# Patient Record
Sex: MALE | URBAN - NONMETROPOLITAN AREA
[De-identification: names, ages, dates, MRNs, and addresses within clinical notes are randomized per-mention and may not be internally consistent; named-entity substitution may affect disease eponyms.]

---
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
## 2021-05-12 ENCOUNTER — DOCTOR'S OFFICE (OUTPATIENT)
Dept: URBAN - NONMETROPOLITAN AREA CLINIC 1 | Facility: CLINIC | Age: 21
Setting detail: OPHTHALMOLOGY
End: 2021-05-12
Payer: COMMERCIAL

## 2021-05-12 VITALS — HEIGHT: 49 IN

## 2021-05-12 DIAGNOSIS — Z01.00: ICD-10-CM

## 2021-05-12 DIAGNOSIS — H52.13: ICD-10-CM

## 2021-05-12 PROCEDURE — 92015 DETERMINE REFRACTIVE STATE: CPT | Performed by: OPTOMETRIST

## 2021-05-12 PROCEDURE — 92014 COMPRE OPH EXAM EST PT 1/>: CPT | Performed by: OPTOMETRIST

## 2021-05-12 PROCEDURE — 92310 CONTACT LENS FITTING OU: CPT | Performed by: OPTOMETRIST

## 2021-05-12 ASSESSMENT — REFRACTION_MANIFEST
OS_CYLINDER: -0.50
OS_SPHERE: -4.50
OD_SPHERE: -4.50
OD_AXIS: 035
OS_VA1: 20/25
OS_AXIS: 125
OD_CYLINDER: -1.00
OS_VA2: 20/25
OD_VA2: 20/25
OD_VA1: 20/25
OU_VA: 20/25

## 2021-05-12 ASSESSMENT — REFRACTION_CURRENTRX
OS_SPHERE: -4.25
OD_CYLINDER: -0.75
OS_OVR_VA: 20/
OS_CYLINDER: -0.25
OD_AXIS: 035
OD_SPHERE: -4.25
OS_VPRISM_DIRECTION: SV
OD_OVR_VA: 20/
OD_VPRISM_DIRECTION: SV
OS_AXIS: 150

## 2021-05-12 ASSESSMENT — VISUAL ACUITY
OS_BCVA: 20/25-2
OD_BCVA: 20/25-1

## 2021-05-12 ASSESSMENT — REFRACTION_AUTOREFRACTION
OD_SPHERE: -4.50
OD_CYLINDER: -1.00
OS_CYLINDER: -0.50
OS_AXIS: 126
OS_SPHERE: -4.50
OD_AXIS: 030

## 2021-05-12 ASSESSMENT — SPHEQUIV_DERIVED
OD_SPHEQUIV: -5
OS_SPHEQUIV: -4.75
OS_SPHEQUIV: -4.75
OD_SPHEQUIV: -5

## 2021-05-12 ASSESSMENT — CONFRONTATIONAL VISUAL FIELD TEST (CVF)
OD_FINDINGS: FULL
OS_FINDINGS: FULL

## 2021-05-12 ASSESSMENT — TONOMETRY
OS_IOP_MMHG: 15
OD_IOP_MMHG: 15

## 2021-08-09 ENCOUNTER — OPTICAL OFFICE (OUTPATIENT)
Dept: URBAN - NONMETROPOLITAN AREA CLINIC 4 | Facility: CLINIC | Age: 21
Setting detail: OPHTHALMOLOGY
End: 2021-08-09

## 2021-08-09 DIAGNOSIS — H52.13: ICD-10-CM

## 2021-08-09 PROCEDURE — S0500 DISPOS CONT LENS: HCPCS | Performed by: OPTOMETRIST

## 2022-02-23 ENCOUNTER — DOCTOR'S OFFICE (OUTPATIENT)
Dept: URBAN - NONMETROPOLITAN AREA CLINIC 1 | Facility: CLINIC | Age: 22
Setting detail: OPHTHALMOLOGY
End: 2022-02-23
Payer: COMMERCIAL

## 2022-02-23 ENCOUNTER — OPTICAL OFFICE (OUTPATIENT)
Dept: URBAN - NONMETROPOLITAN AREA CLINIC 4 | Facility: CLINIC | Age: 22
Setting detail: OPHTHALMOLOGY
End: 2022-02-23
Payer: COMMERCIAL

## 2022-02-23 DIAGNOSIS — H52.13: ICD-10-CM

## 2022-02-23 DIAGNOSIS — Z01.00: ICD-10-CM

## 2022-02-23 PROCEDURE — S0500 DISPOS CONT LENS: HCPCS | Performed by: OPTOMETRIST

## 2022-02-23 PROCEDURE — 92014 COMPRE OPH EXAM EST PT 1/>: CPT | Performed by: OPTOMETRIST

## 2022-02-23 PROCEDURE — 92310 CONTACT LENS FITTING OU: CPT | Performed by: OPTOMETRIST

## 2022-02-23 ASSESSMENT — REFRACTION_AUTOREFRACTION
OD_SPHERE: -4.50
OD_CYLINDER: -1.00
OS_SPHERE: -4.50
OS_AXIS: 126
OS_CYLINDER: -0.50
OD_AXIS: 030

## 2022-02-23 ASSESSMENT — REFRACTION_MANIFEST
OD_AXIS: 035
OS_VA1: 20/25
OS_CYLINDER: -0.50
OD_CYLINDER: -1.00
OS_AXIS: 125
OU_VA: 20/25
OD_VA2: 20/25
OS_SPHERE: -4.50
OD_VA1: 20/25
OD_SPHERE: -4.50
OS_VA2: 20/25

## 2022-02-23 ASSESSMENT — SPHEQUIV_DERIVED
OS_SPHEQUIV: -4.75
OD_SPHEQUIV: -5
OD_SPHEQUIV: -5
OS_SPHEQUIV: -4.75

## 2022-02-23 ASSESSMENT — REFRACTION_CURRENTRX
OD_CYLINDER: -0.75
OS_VPRISM_DIRECTION: SV
OD_VPRISM_DIRECTION: SV
OD_AXIS: 035
OD_SPHERE: -4.25
OS_OVR_VA: 20/
OD_OVR_VA: 20/
OS_CYLINDER: -0.25
OS_AXIS: 150
OS_SPHERE: -4.25

## 2022-02-23 ASSESSMENT — TONOMETRY
OD_IOP_MMHG: 15
OS_IOP_MMHG: 15

## 2022-02-23 ASSESSMENT — VISUAL ACUITY
OD_BCVA: 20/20-2
OS_BCVA: 20/20-2

## 2022-02-23 ASSESSMENT — CONFRONTATIONAL VISUAL FIELD TEST (CVF)
OD_FINDINGS: FULL
OS_FINDINGS: FULL

## 2022-07-15 ENCOUNTER — OPTICAL OFFICE (OUTPATIENT)
Dept: URBAN - NONMETROPOLITAN AREA CLINIC 4 | Facility: CLINIC | Age: 22
Setting detail: OPHTHALMOLOGY
End: 2022-07-15
Payer: COMMERCIAL

## 2022-07-15 DIAGNOSIS — H52.13: ICD-10-CM

## 2022-07-15 PROCEDURE — S0500 DISPOS CONT LENS: HCPCS | Performed by: OPTOMETRIST

## 2023-01-26 ENCOUNTER — OPTICAL OFFICE (OUTPATIENT)
Dept: URBAN - NONMETROPOLITAN AREA CLINIC 4 | Facility: CLINIC | Age: 23
Setting detail: OPHTHALMOLOGY
End: 2023-01-26
Payer: COMMERCIAL

## 2023-01-26 DIAGNOSIS — H52.13: ICD-10-CM

## 2023-01-26 PROCEDURE — S0500 DISPOS CONT LENS: HCPCS | Performed by: OPTOMETRIST

## 2023-02-28 ENCOUNTER — DOCTOR'S OFFICE (OUTPATIENT)
Dept: URBAN - NONMETROPOLITAN AREA CLINIC 1 | Facility: CLINIC | Age: 23
Setting detail: OPHTHALMOLOGY
End: 2023-02-28
Payer: COMMERCIAL

## 2023-02-28 VITALS — HEIGHT: 60 IN

## 2023-02-28 DIAGNOSIS — H52.13: ICD-10-CM

## 2023-02-28 DIAGNOSIS — Z01.00: ICD-10-CM

## 2023-02-28 PROCEDURE — 92014 COMPRE OPH EXAM EST PT 1/>: CPT | Performed by: OPTOMETRIST

## 2023-02-28 PROCEDURE — 92310 CONTACT LENS FITTING OU: CPT | Performed by: OPTOMETRIST

## 2023-02-28 PROCEDURE — 92015 DETERMINE REFRACTIVE STATE: CPT | Performed by: OPTOMETRIST

## 2023-02-28 ASSESSMENT — REFRACTION_AUTOREFRACTION
OD_CYLINDER: -1.75
OS_AXIS: 120
OS_CYLINDER: -0.50
OD_AXIS: 47
OD_SPHERE: -4.75
OS_SPHERE: -4.75

## 2023-02-28 ASSESSMENT — REFRACTION_CURRENTRX
OD_AXIS: 40
OS_CYLINDER: -0.25
OS_SPHERE: -4.25
OD_VPRISM_DIRECTION: SV
OS_OVR_VA: 20/
OS_VPRISM_DIRECTION: SV
OS_AXIS: 149
OD_OVR_VA: 20/
OD_CYLINDER: -0.75
OD_SPHERE: -4.25

## 2023-02-28 ASSESSMENT — CONFRONTATIONAL VISUAL FIELD TEST (CVF)
OS_FINDINGS: FULL
OD_FINDINGS: FULL

## 2023-02-28 ASSESSMENT — TONOMETRY
OS_IOP_MMHG: 14
OD_IOP_MMHG: 14

## 2023-02-28 ASSESSMENT — REFRACTION_MANIFEST
OD_VA2: 20/25
OU_VA: 20/20
OS_VA1: 20/20-2
OS_CYLINDER: -0.50
OS_SPHERE: -4.50
OD_AXIS: 045
OS_AXIS: 125
OS_VA2: 20/20
OD_VA1: 20/25
OD_SPHERE: -4.50
OD_CYLINDER: -1.50

## 2023-02-28 ASSESSMENT — SPHEQUIV_DERIVED
OD_SPHEQUIV: -5.625
OS_SPHEQUIV: -5
OD_SPHEQUIV: -5.25
OS_SPHEQUIV: -4.75

## 2023-02-28 ASSESSMENT — VISUAL ACUITY
OD_BCVA: 20/20
OS_BCVA: 20/25

## 2023-09-29 ENCOUNTER — RX ONLY (RX ONLY)
Age: 23
End: 2023-09-29

## 2023-09-29 ENCOUNTER — DOCTOR'S OFFICE (OUTPATIENT)
Dept: URBAN - NONMETROPOLITAN AREA CLINIC 1 | Facility: CLINIC | Age: 23
Setting detail: OPHTHALMOLOGY
End: 2023-09-29
Payer: COMMERCIAL

## 2023-09-29 DIAGNOSIS — H00.034: ICD-10-CM

## 2023-09-29 PROCEDURE — 67700 BLEPHAROTOMY DRG ABSC EYELID: CPT | Performed by: OPHTHALMOLOGY

## 2023-09-29 ASSESSMENT — REFRACTION_AUTOREFRACTION
OS_SPHERE: -4.75
OD_AXIS: 47
OS_AXIS: 120
OD_CYLINDER: -1.75
OS_CYLINDER: -0.50
OD_SPHERE: -4.75

## 2023-09-29 ASSESSMENT — REFRACTION_MANIFEST
OS_VA1: 20/20-2
OS_VA2: 20/20
OD_CYLINDER: -1.50
OD_SPHERE: -4.50
OS_AXIS: 125
OD_VA2: 20/25
OS_SPHERE: -4.50
OD_AXIS: 045
OU_VA: 20/20
OS_CYLINDER: -0.50
OD_VA1: 20/25

## 2023-09-29 ASSESSMENT — SPHEQUIV_DERIVED
OD_SPHEQUIV: -5.625
OS_SPHEQUIV: -5
OS_SPHEQUIV: -4.75
OD_SPHEQUIV: -5.25

## 2023-09-29 ASSESSMENT — KERATOMETRY
OS_K2POWER_DIOPTERS: 44.25
OD_K1POWER_DIOPTERS: 42.25
OS_K1POWER_DIOPTERS: 43.50
OD_AXISANGLE_DEGREES: 111
OD_K2POWER_DIOPTERS: 44.00
OS_AXISANGLE_DEGREES: 074

## 2023-09-29 ASSESSMENT — REFRACTION_CURRENTRX
OS_CYLINDER: -0.25
OS_AXIS: 149
OD_VPRISM_DIRECTION: SV
OS_OVR_VA: 20/
OS_VPRISM_DIRECTION: SV
OD_CYLINDER: -0.75
OD_SPHERE: -4.25
OD_OVR_VA: 20/
OS_SPHERE: -4.25
OD_AXIS: 40

## 2023-09-29 ASSESSMENT — CONFRONTATIONAL VISUAL FIELD TEST (CVF)
OS_FINDINGS: FULL
OD_FINDINGS: FULL

## 2023-09-29 ASSESSMENT — DRY EYES - PHYSICIAN NOTES
OS_GENERALCOMMENTS: TRACE PEE
OD_GENERALCOMMENTS: TRACE PEE

## 2023-09-29 ASSESSMENT — AXIALLENGTH_DERIVED
OS_AL: 25.4389
OD_AL: 26.1763
OS_AL: 25.5526
OD_AL: 25.9977

## 2023-09-29 ASSESSMENT — VISUAL ACUITY
OS_BCVA: 20/20-1
OD_BCVA: 20/20-1

## 2023-10-11 ENCOUNTER — DOCTOR'S OFFICE (OUTPATIENT)
Dept: URBAN - NONMETROPOLITAN AREA CLINIC 1 | Facility: CLINIC | Age: 23
Setting detail: OPHTHALMOLOGY
End: 2023-10-11
Payer: COMMERCIAL

## 2023-10-11 DIAGNOSIS — H00.034: ICD-10-CM

## 2023-10-11 PROCEDURE — NO CHARGE N/C PROFESSIONAL COURTESY: Performed by: OPHTHALMOLOGY

## 2023-10-12 ASSESSMENT — REFRACTION_MANIFEST
OU_VA: 20/20
OS_SPHERE: -4.50
OS_VA2: 20/20
OD_AXIS: 045
OS_AXIS: 125
OD_VA1: 20/25
OS_VA1: 20/20-2
OD_VA2: 20/25
OS_CYLINDER: -0.50
OD_SPHERE: -4.50
OD_CYLINDER: -1.50

## 2023-10-12 ASSESSMENT — REFRACTION_AUTOREFRACTION
OS_AXIS: 120
OD_CYLINDER: -1.75
OS_CYLINDER: -0.50
OD_SPHERE: -4.75
OS_SPHERE: -4.75
OD_AXIS: 47

## 2023-10-12 ASSESSMENT — KERATOMETRY
OD_AXISANGLE_DEGREES: 111
OD_K1POWER_DIOPTERS: 42.25
OS_K1POWER_DIOPTERS: 43.50
OS_AXISANGLE_DEGREES: 074
OS_K2POWER_DIOPTERS: 44.25
OD_K2POWER_DIOPTERS: 44.00

## 2023-10-12 ASSESSMENT — SPHEQUIV_DERIVED
OD_SPHEQUIV: -5.625
OS_SPHEQUIV: -5
OS_SPHEQUIV: -4.75
OD_SPHEQUIV: -5.25

## 2023-10-12 ASSESSMENT — VISUAL ACUITY
OD_BCVA: 20/20-1
OS_BCVA: 20/20-1

## 2023-10-12 ASSESSMENT — AXIALLENGTH_DERIVED
OD_AL: 25.9977
OS_AL: 25.5526
OD_AL: 26.1763
OS_AL: 25.4389

## 2023-10-12 ASSESSMENT — REFRACTION_CURRENTRX
OS_AXIS: 149
OD_CYLINDER: -0.75
OS_OVR_VA: 20/
OS_VPRISM_DIRECTION: SV
OD_VPRISM_DIRECTION: SV
OD_AXIS: 40
OD_OVR_VA: 20/
OS_SPHERE: -4.25
OS_CYLINDER: -0.25
OD_SPHERE: -4.25

## 2024-06-23 ENCOUNTER — HOSPITAL ENCOUNTER (OUTPATIENT)
Facility: HOSPITAL | Age: 24
Setting detail: OBSERVATION
Discharge: HOME | End: 2024-06-25
Attending: EMERGENCY MEDICINE | Admitting: STUDENT IN AN ORGANIZED HEALTH CARE EDUCATION/TRAINING PROGRAM
Payer: COMMERCIAL

## 2024-06-23 ENCOUNTER — APPOINTMENT (EMERGENCY)
Dept: RADIOLOGY | Facility: HOSPITAL | Age: 24
End: 2024-06-23
Payer: COMMERCIAL

## 2024-06-23 DIAGNOSIS — R00.2 PALPITATION: Primary | ICD-10-CM

## 2024-06-23 DIAGNOSIS — R07.9 CHEST PAIN: ICD-10-CM

## 2024-06-23 DIAGNOSIS — R79.89 ELEVATED TROPONIN LEVEL: ICD-10-CM

## 2024-06-23 DIAGNOSIS — E78.2 MIXED HYPERLIPIDEMIA: ICD-10-CM

## 2024-06-23 DIAGNOSIS — I51.4 MYOCARDITIS (HCC): ICD-10-CM

## 2024-06-23 DIAGNOSIS — R79.89 ELEVATED TROPONIN I LEVEL: ICD-10-CM

## 2024-06-23 DIAGNOSIS — L24.81 IRRITANT CONTACT DERMATITIS DUE TO METALS: ICD-10-CM

## 2024-06-23 DIAGNOSIS — R00.0 TACHYCARDIA: ICD-10-CM

## 2024-06-23 PROBLEM — R07.89 CHEST DISCOMFORT: Status: ACTIVE | Noted: 2024-06-23

## 2024-06-23 PROBLEM — E83.42 HYPOMAGNESEMIA: Status: ACTIVE | Noted: 2024-06-23

## 2024-06-23 PROBLEM — F41.1 GENERALIZED ANXIETY DISORDER WITH PANIC ATTACKS: Status: ACTIVE | Noted: 2024-06-23

## 2024-06-23 PROBLEM — F41.0 ANXIETY DISORDER DUE TO GENERAL MEDICAL CONDITION WITH PANIC ATTACK: Status: ACTIVE | Noted: 2024-06-23

## 2024-06-23 PROBLEM — F06.4 ANXIETY DISORDER DUE TO GENERAL MEDICAL CONDITION WITH PANIC ATTACK: Status: ACTIVE | Noted: 2024-06-23

## 2024-06-23 LAB
2HR DELTA HS TROPONIN: 220 NG/L
4HR DELTA HS TROPONIN: 1269 NG/L
ALBUMIN SERPL BCG-MCNC: 4.4 G/DL (ref 3.5–5)
ALP SERPL-CCNC: 76 U/L (ref 34–104)
ALT SERPL W P-5'-P-CCNC: 17 U/L (ref 7–52)
ANION GAP SERPL CALCULATED.3IONS-SCNC: 8 MMOL/L (ref 4–13)
APTT PPP: 23 SECONDS (ref 23–37)
AST SERPL W P-5'-P-CCNC: 22 U/L (ref 13–39)
BASOPHILS # BLD AUTO: 0.03 THOUSANDS/ÂΜL (ref 0–0.1)
BASOPHILS NFR BLD AUTO: 0 % (ref 0–1)
BILIRUB SERPL-MCNC: 0.21 MG/DL (ref 0.2–1)
BNP SERPL-MCNC: 6 PG/ML (ref 0–100)
BUN SERPL-MCNC: 16 MG/DL (ref 5–25)
CALCIUM SERPL-MCNC: 9.4 MG/DL (ref 8.4–10.2)
CARDIAC TROPONIN I PNL SERPL HS: 1661 NG/L
CARDIAC TROPONIN I PNL SERPL HS: 392 NG/L
CARDIAC TROPONIN I PNL SERPL HS: 612 NG/L
CHLORIDE SERPL-SCNC: 106 MMOL/L (ref 96–108)
CK SERPL-CCNC: 210 U/L (ref 39–308)
CO2 SERPL-SCNC: 25 MMOL/L (ref 21–32)
CREAT SERPL-MCNC: 1.18 MG/DL (ref 0.6–1.3)
D DIMER PPP FEU-MCNC: <0.27 UG/ML FEU
EOSINOPHIL # BLD AUTO: 0.28 THOUSAND/ÂΜL (ref 0–0.61)
EOSINOPHIL NFR BLD AUTO: 3 % (ref 0–6)
ERYTHROCYTE [DISTWIDTH] IN BLOOD BY AUTOMATED COUNT: 12.8 % (ref 11.6–15.1)
EST. AVERAGE GLUCOSE BLD GHB EST-MCNC: 108 MG/DL
GFR SERPL CREATININE-BSD FRML MDRD: 86 ML/MIN/1.73SQ M
GLUCOSE SERPL-MCNC: 96 MG/DL (ref 65–140)
HBA1C MFR BLD: 5.4 %
HCT VFR BLD AUTO: 43.1 % (ref 36.5–49.3)
HGB BLD-MCNC: 14 G/DL (ref 12–17)
IMM GRANULOCYTES # BLD AUTO: 0.02 THOUSAND/UL (ref 0–0.2)
IMM GRANULOCYTES NFR BLD AUTO: 0 % (ref 0–2)
INR PPP: 0.92 (ref 0.84–1.19)
LACTATE SERPL-SCNC: 1.1 MMOL/L (ref 0.5–2)
LIPASE SERPL-CCNC: 20 U/L (ref 11–82)
LITHIUM SERPL-SCNC: <0.1 MMOL/L (ref 0.6–1.2)
LYMPHOCYTES # BLD AUTO: 2.57 THOUSANDS/ÂΜL (ref 0.6–4.47)
LYMPHOCYTES NFR BLD AUTO: 26 % (ref 14–44)
MAGNESIUM SERPL-MCNC: 1.8 MG/DL (ref 1.9–2.7)
MCH RBC QN AUTO: 28.6 PG (ref 26.8–34.3)
MCHC RBC AUTO-ENTMCNC: 32.5 G/DL (ref 31.4–37.4)
MCV RBC AUTO: 88 FL (ref 82–98)
MONOCYTES # BLD AUTO: 0.7 THOUSAND/ÂΜL (ref 0.17–1.22)
MONOCYTES NFR BLD AUTO: 7 % (ref 4–12)
NEUTROPHILS # BLD AUTO: 6.28 THOUSANDS/ÂΜL (ref 1.85–7.62)
NEUTS SEG NFR BLD AUTO: 64 % (ref 43–75)
NRBC BLD AUTO-RTO: 0 /100 WBCS
PLATELET # BLD AUTO: 266 THOUSANDS/UL (ref 149–390)
PMV BLD AUTO: 10.6 FL (ref 8.9–12.7)
POTASSIUM SERPL-SCNC: 4.5 MMOL/L (ref 3.5–5.3)
PROT SERPL-MCNC: 7.3 G/DL (ref 6.4–8.4)
PROTHROMBIN TIME: 12.7 SECONDS (ref 11.6–14.5)
RBC # BLD AUTO: 4.89 MILLION/UL (ref 3.88–5.62)
SODIUM SERPL-SCNC: 139 MMOL/L (ref 135–147)
TSH SERPL DL<=0.05 MIU/L-ACNC: 4.43 UIU/ML (ref 0.45–4.5)
WBC # BLD AUTO: 9.88 THOUSAND/UL (ref 4.31–10.16)

## 2024-06-23 PROCEDURE — 99285 EMERGENCY DEPT VISIT HI MDM: CPT

## 2024-06-23 PROCEDURE — 84443 ASSAY THYROID STIM HORMONE: CPT | Performed by: EMERGENCY MEDICINE

## 2024-06-23 PROCEDURE — 80053 COMPREHEN METABOLIC PANEL: CPT | Performed by: EMERGENCY MEDICINE

## 2024-06-23 PROCEDURE — 93005 ELECTROCARDIOGRAM TRACING: CPT

## 2024-06-23 PROCEDURE — 99285 EMERGENCY DEPT VISIT HI MDM: CPT | Performed by: EMERGENCY MEDICINE

## 2024-06-23 PROCEDURE — 83735 ASSAY OF MAGNESIUM: CPT | Performed by: EMERGENCY MEDICINE

## 2024-06-23 PROCEDURE — 82550 ASSAY OF CK (CPK): CPT | Performed by: EMERGENCY MEDICINE

## 2024-06-23 PROCEDURE — 80178 ASSAY OF LITHIUM: CPT | Performed by: STUDENT IN AN ORGANIZED HEALTH CARE EDUCATION/TRAINING PROGRAM

## 2024-06-23 PROCEDURE — 85025 COMPLETE CBC W/AUTO DIFF WBC: CPT | Performed by: EMERGENCY MEDICINE

## 2024-06-23 PROCEDURE — 83880 ASSAY OF NATRIURETIC PEPTIDE: CPT | Performed by: EMERGENCY MEDICINE

## 2024-06-23 PROCEDURE — 85379 FIBRIN DEGRADATION QUANT: CPT | Performed by: EMERGENCY MEDICINE

## 2024-06-23 PROCEDURE — 84484 ASSAY OF TROPONIN QUANT: CPT | Performed by: EMERGENCY MEDICINE

## 2024-06-23 PROCEDURE — 85730 THROMBOPLASTIN TIME PARTIAL: CPT | Performed by: EMERGENCY MEDICINE

## 2024-06-23 PROCEDURE — 83605 ASSAY OF LACTIC ACID: CPT | Performed by: EMERGENCY MEDICINE

## 2024-06-23 PROCEDURE — 83036 HEMOGLOBIN GLYCOSYLATED A1C: CPT | Performed by: STUDENT IN AN ORGANIZED HEALTH CARE EDUCATION/TRAINING PROGRAM

## 2024-06-23 PROCEDURE — 71045 X-RAY EXAM CHEST 1 VIEW: CPT

## 2024-06-23 PROCEDURE — 83690 ASSAY OF LIPASE: CPT | Performed by: EMERGENCY MEDICINE

## 2024-06-23 PROCEDURE — 85610 PROTHROMBIN TIME: CPT | Performed by: EMERGENCY MEDICINE

## 2024-06-23 PROCEDURE — 36415 COLL VENOUS BLD VENIPUNCTURE: CPT | Performed by: EMERGENCY MEDICINE

## 2024-06-23 PROCEDURE — 99223 1ST HOSP IP/OBS HIGH 75: CPT | Performed by: STUDENT IN AN ORGANIZED HEALTH CARE EDUCATION/TRAINING PROGRAM

## 2024-06-23 RX ORDER — VENLAFAXINE HYDROCHLORIDE 150 MG/1
150 CAPSULE, EXTENDED RELEASE ORAL 2 TIMES DAILY
COMMUNITY

## 2024-06-23 RX ORDER — CHLORPROMAZINE HYDROCHLORIDE 25 MG/1
100 TABLET, FILM COATED ORAL
Status: DISCONTINUED | OUTPATIENT
Start: 2024-06-23 | End: 2024-06-25 | Stop reason: HOSPADM

## 2024-06-23 RX ORDER — LITHIUM CARBONATE 450 MG
900 TABLET, EXTENDED RELEASE ORAL
Status: DISCONTINUED | OUTPATIENT
Start: 2024-06-23 | End: 2024-06-25 | Stop reason: HOSPADM

## 2024-06-23 RX ORDER — PROPRANOLOL HCL 60 MG
60 CAPSULE, EXTENDED RELEASE 24HR ORAL DAILY
Status: DISCONTINUED | OUTPATIENT
Start: 2024-06-23 | End: 2024-06-24

## 2024-06-23 RX ORDER — LITHIUM CARBONATE 450 MG
900 TABLET, EXTENDED RELEASE ORAL
COMMUNITY

## 2024-06-23 RX ORDER — CLONAZEPAM 0.5 MG/1
0.5 TABLET ORAL 2 TIMES DAILY PRN
Status: DISCONTINUED | OUTPATIENT
Start: 2024-06-23 | End: 2024-06-25 | Stop reason: HOSPADM

## 2024-06-23 RX ORDER — METOPROLOL TARTRATE 1 MG/ML
5 INJECTION, SOLUTION INTRAVENOUS EVERY 8 HOURS PRN
Status: DISCONTINUED | OUTPATIENT
Start: 2024-06-23 | End: 2024-06-25 | Stop reason: HOSPADM

## 2024-06-23 RX ORDER — ENOXAPARIN SODIUM 100 MG/ML
40 INJECTION SUBCUTANEOUS DAILY
Status: DISCONTINUED | OUTPATIENT
Start: 2024-06-23 | End: 2024-06-25 | Stop reason: HOSPADM

## 2024-06-23 RX ORDER — CLONAZEPAM 0.5 MG/1
0.5 TABLET ORAL 2 TIMES DAILY PRN
COMMUNITY

## 2024-06-23 RX ORDER — MAGNESIUM SULFATE 1 G/100ML
1 INJECTION INTRAVENOUS ONCE
Status: COMPLETED | OUTPATIENT
Start: 2024-06-23 | End: 2024-06-23

## 2024-06-23 RX ORDER — CHLORPROMAZINE HYDROCHLORIDE 100 MG/1
100 TABLET, FILM COATED ORAL
COMMUNITY
Start: 2024-06-05

## 2024-06-23 RX ORDER — ATORVASTATIN CALCIUM 20 MG/1
20 TABLET, FILM COATED ORAL
Status: DISCONTINUED | OUTPATIENT
Start: 2024-06-23 | End: 2024-06-25 | Stop reason: HOSPADM

## 2024-06-23 RX ORDER — CARIPRAZINE 3 MG/1
3 CAPSULE, GELATIN COATED ORAL DAILY
COMMUNITY

## 2024-06-23 RX ORDER — VENLAFAXINE HYDROCHLORIDE 150 MG/1
150 CAPSULE, EXTENDED RELEASE ORAL 2 TIMES DAILY
Status: DISCONTINUED | OUTPATIENT
Start: 2024-06-23 | End: 2024-06-25 | Stop reason: HOSPADM

## 2024-06-23 RX ADMIN — ASPIRIN 324 MG: 81 TABLET, COATED ORAL at 13:14

## 2024-06-23 RX ADMIN — ENOXAPARIN SODIUM 40 MG: 40 INJECTION SUBCUTANEOUS at 14:31

## 2024-06-23 RX ADMIN — MAGNESIUM SULFATE HEPTAHYDRATE 1 G: 1 INJECTION, SOLUTION INTRAVENOUS at 13:14

## 2024-06-23 RX ADMIN — HYDROCORTISONE: 25 CREAM TOPICAL at 21:31

## 2024-06-23 RX ADMIN — VENLAFAXINE HYDROCHLORIDE 150 MG: 150 CAPSULE, EXTENDED RELEASE ORAL at 21:30

## 2024-06-23 RX ADMIN — CHLORPROMAZINE HYDROCHLORIDE 100 MG: 25 TABLET, FILM COATED ORAL at 21:30

## 2024-06-23 RX ADMIN — PROPRANOLOL HYDROCHLORIDE 60 MG: 60 CAPSULE, EXTENDED RELEASE ORAL at 13:14

## 2024-06-23 RX ADMIN — LITHIUM CARBONATE 900 MG: 450 TABLET ORAL at 21:30

## 2024-06-23 RX ADMIN — VENLAFAXINE HYDROCHLORIDE 150 MG: 150 CAPSULE, EXTENDED RELEASE ORAL at 14:31

## 2024-06-23 RX ADMIN — ATORVASTATIN CALCIUM 20 MG: 20 TABLET, FILM COATED ORAL at 16:17

## 2024-06-23 NOTE — PLAN OF CARE
Problem: PAIN - ADULT  Goal: Verbalizes/displays adequate comfort level or baseline comfort level  Description: Interventions:  - Encourage patient to monitor pain and request assistance  - Assess pain using appropriate pain scale  - Administer analgesics based on type and severity of pain and evaluate response  - Implement non-pharmacological measures as appropriate and evaluate response  - Consider cultural and social influences on pain and pain management  - Notify physician/advanced practitioner if interventions unsuccessful or patient reports new pain  Outcome: Progressing     Problem: INFECTION - ADULT  Goal: Absence or prevention of progression during hospitalization  Description: INTERVENTIONS:  - Assess and monitor for signs and symptoms of infection  - Monitor lab/diagnostic results  - Monitor all insertion sites, i.e. indwelling lines, tubes, and drains  - Monitor endotracheal if appropriate and nasal secretions for changes in amount and color  - Coulter appropriate cooling/warming therapies per order  - Administer medications as ordered  - Instruct and encourage patient and family to use good hand hygiene technique  - Identify and instruct in appropriate isolation precautions for identified infection/condition  Outcome: Progressing     Problem: SAFETY ADULT  Goal: Patient will remain free of falls  Description: INTERVENTIONS:  - Educate patient/family on patient safety including physical limitations  - Instruct patient to call for assistance with activity   - Consult OT/PT to assist with strengthening/mobility   - Keep Call bell within reach  - Keep bed low and locked with side rails adjusted as appropriate  - Keep care items and personal belongings within reach  - Initiate and maintain comfort rounds  - Make Fall Risk Sign visible to staff    - Apply yellow socks and bracelet for high fall risk patients  - Consider moving patient to room near nurses station  Outcome: Progressing  Goal: Maintain or  return to baseline ADL function  Description: INTERVENTIONS:  -  Assess patient's ability to carry out ADLs; assess patient's baseline for ADL function and identify physical deficits which impact ability to perform ADLs (bathing, care of mouth/teeth, toileting, grooming, dressing, etc.)  - Assess/evaluate cause of self-care deficits   - Assess range of motion  - Assess patient's mobility; develop plan if impaired  - Assess patient's need for assistive devices and provide as appropriate  - Encourage maximum independence but intervene and supervise when necessary  - Involve family in performance of ADLs  - Assess for home care needs following discharge   - Consider OT consult to assist with ADL evaluation and planning for discharge  - Provide patient education as appropriate  Outcome: Progressing  Goal: Maintains/Returns to pre admission functional level  Description: INTERVENTIONS:  - Perform AM-PAC 6 Click Basic Mobility/ Daily Activity assessment daily.  - Set and communicate daily mobility goal to care team and patient/family/caregiver.   - Collaborate with rehabilitation services on mobility goals if consulted    - Out of bed for toileting  - Record patient progress and toleration of activity level   Outcome: Progressing     Problem: DISCHARGE PLANNING  Goal: Discharge to home or other facility with appropriate resources  Description: INTERVENTIONS:  - Identify barriers to discharge w/patient and caregiver  - Arrange for needed discharge resources and transportation as appropriate  - Identify discharge learning needs (meds, wound care, etc.)  - Arrange for interpretive services to assist at discharge as needed  - Refer to Case Management Department for coordinating discharge planning if the patient needs post-hospital services based on physician/advanced practitioner order or complex needs related to functional status, cognitive ability, or social support system  Outcome: Progressing     Problem: Knowledge  Deficit  Goal: Patient/family/caregiver demonstrates understanding of disease process, treatment plan, medications, and discharge instructions  Description: Complete learning assessment and assess knowledge base.  Interventions:  - Provide teaching at level of understanding  - Provide teaching via preferred learning methods  Outcome: Progressing     Problem: CARDIOVASCULAR - ADULT  Goal: Maintains optimal cardiac output and hemodynamic stability  Description: INTERVENTIONS:  - Monitor I/O, vital signs and rhythm  - Monitor for S/S and trends of decreased cardiac output  - Administer and titrate ordered vasoactive medications to optimize hemodynamic stability  - Assess quality of pulses, skin color and temperature  - Assess for signs of decreased coronary artery perfusion  - Instruct patient to report change in severity of symptoms  Outcome: Progressing  Goal: Absence of cardiac dysrhythmias or at baseline rhythm  Description: INTERVENTIONS:  - Continuous cardiac monitoring, vital signs, obtain 12 lead EKG if ordered  - Administer antiarrhythmic and heart rate control medications as ordered  - Monitor electrolytes and administer replacement therapy as ordered  Outcome: Progressing     Problem: MOBILITY - ADULT  Goal: Maintain or return to baseline ADL function  Description: INTERVENTIONS:  -  Assess patient's ability to carry out ADLs; assess patient's baseline for ADL function and identify physical deficits which impact ability to perform ADLs (bathing, care of mouth/teeth, toileting, grooming, dressing, etc.)  - Assess/evaluate cause of self-care deficits   - Assess range of motion  - Assess patient's mobility; develop plan if impaired  - Assess patient's need for assistive devices and provide as appropriate  - Encourage maximum independence but intervene and supervise when necessary  - Involve family in performance of ADLs  - Assess for home care needs following discharge   - Consider OT consult to assist with ADL  evaluation and planning for discharge  - Provide patient education as appropriate  Outcome: Progressing  Goal: Maintains/Returns to pre admission functional level  Description: INTERVENTIONS:  - Perform AM-PAC 6 Click Basic Mobility/ Daily Activity assessment daily.  - Set and communicate daily mobility goal to care team and patient/family/caregiver.   - Collaborate with rehabilitation services on mobility goals if consulted    - Out of bed for toileting  - Record patient progress and toleration of activity level   Outcome: Progressing

## 2024-06-23 NOTE — ASSESSMENT & PLAN NOTE
"Has hx of HARMONY with panic attacks \"once every month or so\"   Used to be on Propranolol about 3 years ago and uses PRN Klonopin, restarting /continuing both especially now with the reported tachycardia event on 6/23   To continue follow up with PCP and discuss referral to Behavioral Health / Psych Therapy as indicated  Mood is very stable and in no distress during admission   continue POA Lithium  Lithium level <0.10  continue Effexor   hold off Cariprazine for now given suspected side effect/arrhythmia - will discuss with cardiology regarding resuming med  continue POA Chlorpromazine   Started on propranolol, but d/c and switched to toprol XL  PRN Klonopin   "

## 2024-06-23 NOTE — ASSESSMENT & PLAN NOTE
"Lower abdominal wall with rash x 2 weeks from new belt as per patient  Has been using OTC Hydrocortisone which \"was helping\" will continue while inpatient  "

## 2024-06-23 NOTE — ASSESSMENT & PLAN NOTE
" Latest Reference Range & Units 06/23/24 07:44 06/23/24 09:13 06/23/24 11:48   Total CK 39 - 308 U/L 210     hs TnI 0hr \"Refer to ACS Flowchart\"- see link ng/L 392 (H)     hs TnI 2hr \"Refer to ACS Flowchart\"- see link ng/L  612 (H)    Delta 2hr hsTnI <20 ng/L  220 (H)    hs TnI 4hr \"Refer to ACS Flowchart\"- see link ng/L   1,661 (H)   Delta 4hr hsTnI <20 ng/L   1,269 (H)   BNP 0 - 100 pg/mL 6         Most likely type II , non ischemic due to the palpitation / tachycardia with HR reportedly > 200 for ~ 2 hours prior to admission from 5:30 AM to about 7:30 AM per patient   Repeate EKG ~ 1 PM , NSR , no ST changes.   Explained while less likely but for completeness and given no hx of bleeding issue, will give  mg x 1 dose for now till further Cardiology eval   Will continue to monitor on Tele       "

## 2024-06-23 NOTE — ASSESSMENT & PLAN NOTE
09/08/2023 06/15/2023         Cholesterol 209 High     214 High       Triglyceride 162 High     141   Cholesterol, HDL, Direct 42 52   Cholesterol, Non- High     162 High       Cholesterol, LDL, Calculated 135 High      134 High        CHOL/HDL Ratio 5.0 4     HLD, BMI 34.9, hx of tachycardia in setting of anxiety and panic attacks, and patient is concerned about family hx of cardiac disease   A1c 5.4  discussed given age life style modification, diet and exercise is first line recommendations   the option of low-medium intensity statin is explained and patient prefers to start , ordered Lipitor 20 mg QD , possible side effects explained including myopathy and LFTs elevation , will check CMP in 1-2 weeks post discharge

## 2024-06-23 NOTE — H&P
"[facility]  H&P  Name: Sukih Blanca 24 y.o. male I MRN: [id number]  Unit/Bed#: -[facility] I Date of Admission: 6/23/2024   Date of Service: 6/23/2024 I Hospital Day: 0      Assessment & Plan   Tachycardia  Assessment & Plan  Hx of anxiety disorder with panic attacks ~ once every month , per patient  He used to be on Propranolol in the past which used to help with his HR and Anxiety symptoms , but about 2-3 years ago when he switched provider - as per patient - the med was stopped \"thought to be not helping much with the anxiety\"   Denies excessive coffee intake, \"only 1-2 Brisk iced tea a day\" , denies coffee or energy drinks use, denies drug abuse, non smoker.   TSH wnl     6/23 from ~ 5:30 am - ~ 7:30 am while was at his night shift at [employer] felt palpitation (lasted ~ 2 hours) with mild diaphoresis and transient chest discomfort described as \"somewhat sharp went toward jaw and last ~ 7 minutes then resolved but palpitation remained for two hours\"     In the ED and during admission now NSR 70-80's with BP mildly elevated ~130's low 140's ; Heart sounds normal , no murmur, no JVD, euvolemic on exam , CTAB     Patient is on multiple psych meds which is known to possibly cause arrhythmia and also has hx of panic attacks.     PLAN  - Given the reported tachycardia and panic attacks events will restart propranolol at low dose for now to help with both   - continue to monitor on tele and potentially will need extended monitor , consult cardiology , input appreciated   - TSH wnl   - will obtain ECHO to evaluate and rule out anatomical /valvular dysfunction, but less likely    - given on Lithium which may cause Qtc prolongation, will check the Lithium level. Also on Chloropromazine which can cause QT prolongation but of note Qtc 6/23 on repeate EKG is WNL   - noted patient on Effexor 150 mg BID (exceeding max recommended dose) and given tachycardia is possible side effect , will decrease " "to 150 mg QD   - holding off Cariprazine for now due to the known cardiotoxicity/arrhythmia side effects , till further eval       * Elevated troponin level  Assessment & Plan   Latest Reference Range & Units 06/23/24 07:44 06/23/24 09:13 06/23/24 11:48   Total CK 39 - 308 U/L 210     hs TnI 0hr \"Refer to ACS Flowchart\"- see link ng/L 392 (H)     hs TnI 2hr \"Refer to ACS Flowchart\"- see link ng/L  612 (H)    Delta 2hr hsTnI <20 ng/L  220 (H)    hs TnI 4hr \"Refer to ACS Flowchart\"- see link ng/L   1,661 (H)   Delta 4hr hsTnI <20 ng/L   1,269 (H)   BNP 0 - 100 pg/mL 6         Most likely type II , non ischemic due to the palpitation / tachycardia with HR reportedly > 200 for ~ 2 hours prior to admission from 5:30 AM to about 7:30 AM per patient   Repeate EKG ~ 1 PM , NSR , no ST changes.   Explained while less likely but for completeness and given no hx of bleeding issue, will give  mg x 1 dose for now till further Cardiology eval   Will continue to monitor on Tele         Irritant contact dermatitis due to metals  Assessment & Plan  Lower abdominal wall with rash x 2 weeks from new belt as per patient  Has been using OTC Hydrocortisone which \"was helping\" will continue while inpatient      Hypomagnesemia  Assessment & Plan  Mg 1.8 , repletion ordered 1 g     Chest discomfort  Assessment & Plan  Transient chest discomfort \"somewhat sharp\" radiated to jaw , for about 7 minutes in setting of tachycardia/palpitation with HR measure > 200 BPM for about two hours   Most likely chest discomfort due to the tachycardia , less likely ischemic etiology   Cardiology consulted , input appreciated   See elevated troponin A&P     Mixed hyperlipidemia  Assessment & Plan    09/08/2023 06/15/2023         Cholesterol 209 High     214 High       Triglyceride 162 High     141   Cholesterol, HDL, Direct 42 52   Cholesterol, Non- High     162 High       Cholesterol, LDL, Calculated 135 High      134 High        CHOL/HDL Ratio " "5.0 4     HLD, BMI 34.9, hx of tachycardia in setting of anxiety and panic attacks, and patient is concerned about family hx of cardiac disease   - will check A1c   - discussed given age life style modification, diet and exercise is first line recommendations   - the option of low-medium intensity statin is explained and patient prefers to start , ordered Lipitor 20 mg QD , possible side effects explained including myopathy and LFTs elevation , will check CMP in 1-2 weeks post discharge    Generalized anxiety disorder with panic attacks  Assessment & Plan  Has hx of HARMONY with panic attacks \"once every month or so\"   Used to be on Propranolol about 3 years ago and uses PRN Klonopin, restarting /continuing both especially now with the reported tachycardia event on 6/23   To continue follow up with PCP and discuss referral to Behavioral Health / Psych Therapy as indicated  Mood is very stable and in no distress during admission     - continue POA Lithium but check the level  - continue Effexor but decreased to 150 mg QD not BID   - hold off Cariprazine for now given suspected side effect/arrhythmia   - continue POA Chlorpromazine   - starting Propranolol 60 mg QD for anxiety and tachycardia   - PRN Klonopin              VTE Pharmacologic Prophylaxis:   Moderate Risk (Score 3-4) - Pharmacological DVT Prophylaxis Ordered: enoxaparin (Lovenox).  Code Status: Level 1 - Full Code   Discussion with family: Updated  (mother) at bedside.    Anticipated Length of Stay: Patient will be admitted on an inpatient basis with an anticipated length of stay of greater than 2 midnights secondary to Arrhythmia / elevated troponin , cardiology evaluation .    Total Time Spent on Date of Encounter in care of patient: 50 mins. This time was spent on one or more of the following: performing physical exam; counseling and coordination of care; obtaining or reviewing history; documenting in the medical record; reviewing/ordering " "tests, medications or procedures; communicating with other healthcare professionals and discussing with patient's family/caregivers.    Chief Complaint: tachycardia ~ about 2 hours     History of Present Illness:  Sukhi Blanca is a 24 y.o. male with a PMH of general anxiety disorder, depression, bipolar, on psych Meds as listed above, also has hx of panic attacks ~ once a month and was on Proprnolol in the past but was switched off it to current psych medications about 2-3 years ago , never smoker, hx of HLD and reports family hx of cardiac disease. 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 6/23 ~ 5:30 AM had palpitation ~ 2 hours with HR measured > 200 , during which had transient chest discomfort described as sharp and somewhat radiated to jaw but lasted only for 7 minutes, then resolved but palpitation continued till ~ 7:30     During admission, NSR, normal EKG, all symptoms resolved, mother at bedside. Above A&P reviewed and explained in details. All questions answered and they agree to above.     Review of Systems:  Review of Systems  - GENERAL: Negative for any nausea, vomiting, fevers, chills, or weight loss.  - HEENT: Negative for any head/Neck trauma, pain, double/blurry vision, sinusitis, rhinitis, nose bleeding.  - CARDIAC: Positive for palpitation and chest discomfort, as described above prior to admission, resolved on arrival to the hospital, currently negative for any chest pain, palpitation, Dyspnea on exertion, peripheral edema.  - PULMONARY: Negative for any SOB, cough, wheezing.   - GASTROINTESTINAL: Negative for any abdominal pain, N/V/D/C, blood in stool.   - GENITOURINARY: Negative for any dysuria, hematuria, incontinence.  - NEUROLOGIC: Negative for any muscle weakness, numbness/tingling, memory changes.    - MUSCULOSKELETAL: Negative for any joint pains/swelling, limited ROM.   - INTEGUMENTARY: Negative for any rashes, cuts/ lesions.  - HEMATOLOGIC: " Negative for any abnormal bruising, frequent infections or bleeding.  -Psych: History of depression, anxiety and panic attacks with symptoms including palpitation and diaphoresis about once in a month      Past Medical and Surgical History:   Past Medical History:   Diagnosis Date    Bipolar 1 disorder, mixed (HCC)        Past Surgical History:   Procedure Laterality Date    TYMPANOSTOMY TUBE PLACEMENT         Meds/Allergies:  Prior to Admission medications    Medication Sig Start Date End Date Taking? Authorizing Provider   chlorproMAZINE (THORAZINE) 100 mg tablet Take 100 mg by mouth daily at bedtime 6/5/24  Yes Historical Provider, MD   clonazePAM (KlonoPIN) 0.5 mg tablet Take 0.5 mg by mouth 2 (two) times a day as needed   Yes Historical Provider, MD   lithium carbonate (LITHOBID) 450 mg CR tablet Take 900 mg by mouth daily at bedtime   Yes Historical Provider, MD   venlafaxine (EFFEXOR-XR) 150 mg 24 hr capsule Take 150 mg by mouth 2 (two) times a day   Yes Historical Provider, MD   Vraylar 3 MG capsule Take 3 mg by mouth daily   Yes Historical Provider, MD ELLIOTT have reviewed home medications with patient personally.    Allergies: No Known Allergies    Social History:  Marital Status: Single   Occupation: [employer], stocking shelves night shift  Patient Pre-hospital Living Situation: Home  Patient Pre-hospital Level of Mobility: walks  Patient Pre-hospital Diet Restrictions: None  Substance Use History:   Social History     Substance and Sexual Activity   Alcohol Use Not Currently    Comment: socially     Social History     Tobacco Use   Smoking Status Never   Smokeless Tobacco Never     Social History     Substance and Sexual Activity   Drug Use Never       Family History:  History reviewed. No pertinent family history.    Physical Exam:     Vitals:   Blood Pressure: 133/74 (06/23/24 1508)  Pulse: 80 (06/23/24 1508)  Temperature: 97.7 °F (36.5 °C) (06/23/24 1508)  Temp Source: Temporal (06/23/24  "0711)  Respirations: 18 (06/23/24 1111)  Height: 6' 1\" (185.4 cm) (06/23/24 1000)  Weight - Scale: 120 kg (265 lb) (06/23/24 1000)  SpO2: 97 % (06/23/24 1508)    Physical Exam   - GEN: Appears well, alert and oriented x 3, pleasant and cooperative, in no acute distress  - HEENT: Anicteric, mucous membranes moist, PERRL and EOMI   - NECK: No lymphadenopathy, JVD or carotid bruits   - HEART: RRR, normal S1 and S2, no murmurs, clicks, gallops or rubs   - LUNGS: Clear to auscultation bilaterally; no wheezes, rales, or rhonchi  - ABDOMEN: Normal bowel sounds, soft, no tenderness, no distention, no organomegaly or masses felt on exam.   - EXTREMITIES: Peripheral pulses normal; no clubbing, cyanosis, or edema  - NEURO: No focal findings, CN II-XII are grossly intact.   - Musculoskeletal: 5/5 strength, normal ROM, no swollen or erythematous joints.   - SKIN: Normal without suspicious lesions on exposed skin      Additional Data:     Lab Results:  Results from last 7 days   Lab Units 06/23/24  0744   WBC Thousand/uL 9.88   HEMOGLOBIN g/dL 14.0   HEMATOCRIT % 43.1   PLATELETS Thousands/uL 266   SEGS PCT % 64   LYMPHO PCT % 26   MONO PCT % 7   EOS PCT % 3     Results from last 7 days   Lab Units 06/23/24  0744   SODIUM mmol/L 139   POTASSIUM mmol/L 4.5   CHLORIDE mmol/L 106   CO2 mmol/L 25   BUN mg/dL 16   CREATININE mg/dL 1.18   ANION GAP mmol/L 8   CALCIUM mg/dL 9.4   ALBUMIN g/dL 4.4   TOTAL BILIRUBIN mg/dL 0.21   ALK PHOS U/L 76   ALT U/L 17   AST U/L 22   GLUCOSE RANDOM mg/dL 96     Results from last 7 days   Lab Units 06/23/24  0744   INR  0.92         No results found for: \"HGBA1C\"  Results from last 7 days   Lab Units 06/23/24  0744   LACTIC ACID mmol/L 1.1       Lines/Drains:  Invasive Devices       Peripheral Intravenous Line  Duration             Peripheral IV 06/23/24 Left Antecubital <1 day                        Imaging: Reviewed radiology reports from this admission including: chest xray  XR chest 1 view " portable   ED Interpretation by Yoseph Ch MD (06/23 9394)   NAD      Final Result by Merissa Rodriguez MD (06/23 0822)      No acute cardiopulmonary disease.            Workstation performed: [id number]             EKG and Other Studies Reviewed on Admission:   EKG: NSR , normal VR    ** Please Note: This note has been constructed using a voice recognition system. **

## 2024-06-23 NOTE — ASSESSMENT & PLAN NOTE
"Hx of anxiety disorder with panic attacks ~ once every month , per patient  He used to be on Propranolol in the past which used to help with his HR and Anxiety symptoms , but about 2-3 years ago when he switched provider - as per patient - the med was stopped \"thought to be not helping much with the anxiety\"   Denies excessive coffee intake, \"only 1-2 Brisk iced tea a day\" , denies coffee or energy drinks use, denies drug abuse, non smoker.   TSH wnl     6/23 from ~ 5:30 am - ~ 7:30 am while was at his night shift at [employer] felt palpitation (lasted ~ 2 hours) with mild diaphoresis and transient chest discomfort described as \"somewhat sharp went toward jaw and last ~ 7 minutes then resolved but palpitation remained for two hours\"     In the ED and during admission now NSR 70-80's with BP mildly elevated ~130's low 140's ; Heart sounds normal , no murmur, no JVD, euvolemic on exam , CTAB     Patient is on multiple psych meds which is known to possibly cause arrhythmia and also has hx of panic attacks.     PLAN  - Given the reported tachycardia and panic attacks events will restart propranolol at low dose for now to help with both   - continue to monitor on tele and potentially will need extended monitor , consult cardiology , input appreciated   - TSH wnl   - will obtain ECHO to evaluate and rule out anatomical /valvular dysfunction, but less likely    - given on Lithium which may cause Qtc prolongation, will check the Lithium level. Also on Chloropromazine which can cause QT prolongation but of note Qtc 6/23 on repeate EKG is WNL   - noted patient on Effexor 150 mg BID (exceeding max recommended dose) and given tachycardia is possible side effect , will decrease to 150 mg QD   - holding off Cariprazine for now due to the known cardiotoxicity/arrhythmia side effects , till further eval     "

## 2024-06-23 NOTE — ASSESSMENT & PLAN NOTE
"Hx of anxiety disorder with panic attacks ~ once every month , per patient  He used to be on Propranolol in the past which used to help with his HR and Anxiety symptoms , but about 2-3 years ago when he switched provider - as per patient - the med was stopped \"thought to be not helping much with the anxiety\"   Denies excessive coffee intake, \"only 1-2 Brisk iced tea a day\" , denies coffee or energy drinks use, denies drug abuse, non smoker.   TSH wnl   6/23 from ~ 5:30 am - ~ 7:30 am while was at his night shift at [employer] felt palpitation (lasted ~ 2 hours) with mild diaphoresis and transient chest discomfort described as \"somewhat sharp went toward jaw and last ~ 7 minutes then resolved but palpitation remained for two hours\"   In the ED and during admission now NSR 70-80's with BP mildly elevated ~130's low 140's ; Heart sounds normal , no murmur, no JVD, euvolemic on exam , CTAB   Patient is on multiple psych meds which is known to possibly cause arrhythmia and also has hx of panic attacks.   Given the reported tachycardia and panic attacks events will restart propranolol at low dose for now to help with both   continue to monitor on tele and potentially will need extended monitor, consult cardiology, input appreciated   TSH wnl   Echo: Left ventricular cavity size is normal. Wall thickness is normal. The left ventricular ejection fraction is 71%. Systolic function is hyperdynamic. Although no diagnostic regional wall motion abnormality was identified, this possibility cannot be completely excluded on the basis of this study. Diastolic function is normal. Left atrial filling pressure is normal.   Patient on Chloropromazine and lithium which can cause QT prolongation but of note Qtc 6/23 on repeate EKG is WNL   Lithium level <0.10 - patient endorses not taking this as consistently because he has been adjusting his work shifts and also with recently being sick. Will not adjust lithium dosing at this time.   On " effexor 150 mg bid, will continue, monitor HR on telemetry as possible side effect is tachycardia and patient on higher dose.   holding off Cariprazine for now due to the known cardiotoxicity/arrhythmia side effects until further cards eval  Cardiology: stop propranolol, add toprol xl 25 mg qd, add losartan 25 mg qd, BMP tomorrow, monitor on telemetry, monitor lithium level on losartan. Cardiac MRI tomorrow due to possible concerns of myocarditis.

## 2024-06-23 NOTE — ASSESSMENT & PLAN NOTE
" Latest Reference Range & Units 06/23/24 07:44 06/23/24 09:13 06/23/24 11:48   Total CK 39 - 308 U/L 210     hs TnI 0hr \"Refer to ACS Flowchart\"- see link ng/L 392 (H)     hs TnI 2hr \"Refer to ACS Flowchart\"- see link ng/L  612 (H)    Delta 2hr hsTnI <20 ng/L  220 (H)    hs TnI 4hr \"Refer to ACS Flowchart\"- see link ng/L   1,661 (H)   Delta 4hr hsTnI <20 ng/L   1,269 (H)   BNP 0 - 100 pg/mL 6       Most likely type II, non ischemic due to the palpitation / tachycardia with HR reportedly > 200 for ~ 2 hours prior to admission from 5:30 AM to about 7:30 AM per patient   Repeate EKG ~ 1 PM , NSR , no ST changes.   Explained while less likely but for completeness and given no hx of bleeding issue, s/p  mg x 1 dose until further Cardiology eval   ESR normal, mildly elevated crp   Will continue to monitor on Tele   Cardiology: suspect acute myocarditis presumed viral from gastroenteritis. Cardiac MRI to be done on 6/25. Stop propranolol, add toprol xl 25 mg qd, add losartan 25 mg qd, bmp tomorrow.   "

## 2024-06-23 NOTE — ASSESSMENT & PLAN NOTE
"Has hx of HARMONY with panic attacks \"once every month or so\"   Used to be on Propranolol about 3 years ago and uses PRN Klonopin, restarting /continuing both especially now with the reported tachycardia event on 6/23   To continue follow up with PCP and discuss referral to Behavioral Health / Psych Therapy as indicated  Mood is very stable and in no distress during admission     - continue POA Lithium but check the level  - continue Effexor but decreased to 150 mg QD not BID   - hold off Cariprazine for now given suspected side effect/arrhythmia   - continue POA Chlorpromazine   - starting Propranolol 60 mg QD for anxiety and tachycardia   - PRN Klonopin       "

## 2024-06-23 NOTE — ED PROVIDER NOTES
History  Chief Complaint   Patient presents with    Chest Pain     Pt states around 0530 started with chest pain and palpitations.      Patient was at work when he developed palpitations and left-sided chest discomfort going up to the jaw.  No shortness of breath.  No nausea or vomiting.  Had slight diaphoresis.  Symptoms have been resolving now.  Still feels his heart skipping beats.  No recent cough or cold symptoms.  No change with deep breath or movement.  No recent travel or surgeries.  No history of PE DVT.  No change with deep breath or movement.  Nothing taken for symptoms.  No history of diabetes.  No history of hypertension.  States his cholesterol was high at 1 point but now normal.  Was never on medication.  Does not smoke.  No drug use.  Family history of heart disease on both parents side.  Works at a grocery store.  Nothing seem to make the pain better or worse.  States he does the overnight shift.  Was doing a lot of lifting today.  No change with deep breath or movement.  States that when he had the palpitations he put a pulse ox monitor on at work and it read his heart rate as fast.      History provided by:  Patient   used: No    Chest Pain  Pain location:  L chest  Pain quality: aching    Pain radiates to:  L jaw  Pain radiates to the back: no    Pain severity:  Mild  Onset quality:  Sudden  Duration:  2 hours  Timing:  Constant  Progression:  Improving  Chronicity:  New  Context: no drug use, no intercourse, no movement and no stress    Relieved by:  Nothing  Worsened by:  Nothing tried  Ineffective treatments:  None tried  Associated symptoms: diaphoresis    Associated symptoms: no abdominal pain, no AICD problem, no anorexia, no back pain, no cough, no dizziness, no dysphagia, no fatigue, no fever, no headache, no nausea, no near-syncope, no orthopnea, no palpitations, no shortness of breath, not vomiting and no weakness        None       History reviewed. No pertinent past  medical history.    Past Surgical History:   Procedure Laterality Date    TYMPANOSTOMY TUBE PLACEMENT         History reviewed. No pertinent family history.  I have reviewed and agree with the history as documented.    E-Cigarette/Vaping     E-Cigarette/Vaping Substances     Social History     Tobacco Use    Smoking status: Never    Smokeless tobacco: Never   Substance Use Topics    Alcohol use: Not Currently     Comment: socially    Drug use: Never       Review of Systems   Constitutional:  Positive for diaphoresis. Negative for chills, fatigue and fever.   HENT:  Negative for ear pain, hearing loss, sore throat, trouble swallowing and voice change.    Eyes:  Negative for pain and discharge.   Respiratory:  Negative for cough, shortness of breath and wheezing.    Cardiovascular:  Positive for chest pain. Negative for palpitations, orthopnea and near-syncope.   Gastrointestinal:  Negative for abdominal pain, anorexia, blood in stool, constipation, diarrhea, nausea and vomiting.   Genitourinary:  Negative for dysuria, flank pain, frequency and hematuria.   Musculoskeletal:  Negative for back pain, joint swelling, neck pain and neck stiffness.   Skin:  Negative for rash and wound.   Neurological:  Negative for dizziness, seizures, syncope, facial asymmetry, weakness and headaches.   Psychiatric/Behavioral:  Negative for hallucinations, self-injury and suicidal ideas.    All other systems reviewed and are negative.      Physical Exam  Physical Exam  Vitals and nursing note reviewed.   Constitutional:       General: He is not in acute distress.     Appearance: He is well-developed.   HENT:      Head: Normocephalic and atraumatic.      Right Ear: External ear normal.      Left Ear: External ear normal.   Eyes:      General: No scleral icterus.        Right eye: No discharge.         Left eye: No discharge.      Extraocular Movements: Extraocular movements intact.      Conjunctiva/sclera: Conjunctivae normal.    Cardiovascular:      Rate and Rhythm: Normal rate and regular rhythm.      Heart sounds: Normal heart sounds. No murmur heard.  Pulmonary:      Effort: Pulmonary effort is normal.      Breath sounds: Normal breath sounds. No wheezing or rales.   Abdominal:      General: Bowel sounds are normal. There is no distension.      Palpations: Abdomen is soft.      Tenderness: There is no abdominal tenderness. There is no guarding or rebound.   Musculoskeletal:         General: No deformity. Normal range of motion.      Cervical back: Normal range of motion and neck supple.   Skin:     General: Skin is warm and dry.      Findings: No rash.   Neurological:      General: No focal deficit present.      Mental Status: He is alert and oriented to person, place, and time.      Cranial Nerves: No cranial nerve deficit.   Psychiatric:         Mood and Affect: Mood normal.         Behavior: Behavior normal.         Thought Content: Thought content normal.         Judgment: Judgment normal.         Vital Signs  ED Triage Vitals   Temperature Pulse Respirations Blood Pressure SpO2   06/23/24 0738 06/23/24 0738 06/23/24 0738 06/23/24 0738 06/23/24 0745   (!) 97.4 °F (36.3 °C) 104 18 136/89 99 %      Temp Source Heart Rate Source Patient Position - Orthostatic VS BP Location FiO2 (%)   06/23/24 0738 06/23/24 0738 06/23/24 0738 06/23/24 0738 --   Temporal Monitor Sitting Right arm       Pain Score       --                  Vitals:    06/23/24 0738 06/23/24 0745   BP: 136/89 140/91   Pulse: 104 96   Patient Position - Orthostatic VS: Sitting Lying         Visual Acuity      ED Medications  Medications - No data to display    Diagnostic Studies  Results Reviewed       Procedure Component Value Units Date/Time    HS Troponin I 2hr [[id number]] Collected: 06/23/24 0913    Lab Status: No result Specimen: Blood from Arm, Left     CK [[id number]]  (Normal) Collected: 06/23/24 0744    Lab Status: Final result Specimen: Blood from Arm, Left  Updated: 06/23/24 0833     Total  U/L     D-Dimer [[id number]]  (Normal) Collected: 06/23/24 0744    Lab Status: Final result Specimen: Blood from Arm, Left Updated: 06/23/24 0828     D-Dimer, Quant <0.27 ug/ml FEU     TSH, 3rd generation with Free T4 reflex [[id number]]  (Normal) Collected: 06/23/24 0744    Lab Status: Final result Specimen: Blood from Arm, Left Updated: 06/23/24 0828     TSH 3RD GENERATON 4.429 uIU/mL     HS Troponin I 4hr [[id number]]     Lab Status: No result Specimen: Blood     HS Troponin 0hr (reflex protocol) [[id number]]  (Abnormal) Collected: 06/23/24 0744    Lab Status: Final result Specimen: Blood from Arm, Left Updated: 06/23/24 0819     hs TnI 0hr 392 ng/L     B-Type Natriuretic Peptide(BNP) [[id number]]  (Normal) Collected: 06/23/24 0744    Lab Status: Final result Specimen: Blood from Arm, Left Updated: 06/23/24 0818     BNP 6 pg/mL     Lactic acid, plasma (w/reflex if result > 2.0) [[id number]]  (Normal) Collected: 06/23/24 0744    Lab Status: Final result Specimen: Blood from Arm, Left Updated: 06/23/24 0816     LACTIC ACID 1.1 mmol/L     Narrative:      Result may be elevated if tourniquet was used during collection.    Comprehensive metabolic panel [[id number]] Collected: 06/23/24 0744    Lab Status: Final result Specimen: Blood from Arm, Left Updated: 06/23/24 0816     Sodium 139 mmol/L      Potassium 4.5 mmol/L      Chloride 106 mmol/L      CO2 25 mmol/L      ANION GAP 8 mmol/L      BUN 16 mg/dL      Creatinine 1.18 mg/dL      Glucose 96 mg/dL      Calcium 9.4 mg/dL      AST 22 U/L      ALT 17 U/L      Alkaline Phosphatase 76 U/L      Total Protein 7.3 g/dL      Albumin 4.4 g/dL      Total Bilirubin 0.21 mg/dL      eGFR 86 ml/min/1.73sq m     Narrative:      National Kid[employer] Disease Foundation guidelines for Chronic Kidney Disease (CKD):     Stage 1 with normal or high GFR (GFR > 90 mL/min/1.73 square meters)    Stage 2 Mild CKD (GFR = 60-89 mL/min/1.73 square meters)    Stage 3A  Moderate CKD (GFR = 45-59 mL/min/1.73 square meters)    Stage 3B Moderate CKD (GFR = 30-44 mL/min/1.73 square meters)    Stage 4 Severe CKD (GFR = 15-29 mL/min/1.73 square meters)    Stage 5 End Stage CKD (GFR <15 mL/min/1.73 square meters)  Note: GFR calculation is accurate only with a steady state creatinine    Magnesium [[id number]]  (Abnormal) Collected: 06/23/24 0744    Lab Status: Final result Specimen: Blood from Arm, Left Updated: 06/23/24 0816     Magnesium 1.8 mg/dL     Lipase [[id number]]  (Normal) Collected: 06/23/24 0744    Lab Status: Final result Specimen: Blood from Arm, Left Updated: 06/23/24 0816     Lipase 20 u/L     Protime-INR [[id number]]  (Normal) Collected: 06/23/24 0744    Lab Status: Final result Specimen: Blood from Arm, Left Updated: 06/23/24 0808     Protime 12.7 seconds      INR 0.92    APTT [[id number]]  (Normal) Collected: 06/23/24 0744    Lab Status: Final result Specimen: Blood from Arm, Left Updated: 06/23/24 0808     PTT 23 seconds     CBC and differential [[id number]] Collected: 06/23/24 0744    Lab Status: Final result Specimen: Blood from Arm, Left Updated: 06/23/24 0752     WBC 9.88 Thousand/uL      RBC 4.89 Million/uL      Hemoglobin 14.0 g/dL      Hematocrit 43.1 %      MCV 88 fL      MCH 28.6 pg      MCHC 32.5 g/dL      RDW 12.8 %      MPV 10.6 fL      Platelets 266 Thousands/uL      nRBC 0 /100 WBCs      Segmented % 64 %      Immature Grans % 0 %      Lymphocytes % 26 %      Monocytes % 7 %      Eosinophils Relative 3 %      Basophils Relative 0 %      Absolute Neutrophils 6.28 Thousands/µL      Absolute Immature Grans 0.02 Thousand/uL      Absolute Lymphocytes 2.57 Thousands/µL      Absolute Monocytes 0.70 Thousand/µL      Eosinophils Absolute 0.28 Thousand/µL      Basophils Absolute 0.03 Thousands/µL                    XR chest 1 view portable   ED Interpretation by Yoseph Ch MD (06/23 0758)   NAD      Final Result by Merissa Rodriguez MD (06/23 0822)      No acute  cardiopulmonary disease.            Workstation performed: [id number]                    Procedures  ECG 12 Lead Documentation Only    Date/Time: 6/23/2024 7:43 AM    Performed by: Yoseph Ch MD  Authorized by: Yoseph Ch MD    ECG reviewed by me, the ED Provider: yes    Patient location:  ED  Rhythm:     Rhythm: sinus rhythm    Ectopy:     Ectopy: none    QRS:     QRS axis:  Normal           ED Course  ED Course as of 06/23/24 0913   Sun Jun 23, 2024   0820 hs TnI 0hr(!): 392   0825 hs TnI 0hr(!): 392   0825 BNP: 6   0826 Patient currently without any pain.   0829 D-Dimer, Quant: <0.27   0910 Discussed with cardiology on-call, Dr. Laureano.  States to admit for observation.  Serial troponins.  Look for other causes besides ACS that could be causing this.             HEART Risk Score      Flowsheet Row Most Recent Value   Heart Score Risk Calculator    History 1 Filed at: 06/23/2024 0742   ECG 0 Filed at: 06/23/2024 0742   Age 0 Filed at: 06/23/2024 0742   Risk Factors 1 Filed at: 06/23/2024 0742   Troponin 2 Filed at: 06/23/2024 0742   HEART Score 4 Filed at: 06/23/2024 0742                          SBIRT 20yo+      Flowsheet Row Most Recent Value   Initial Alcohol Screen: US AUDIT-C     1. How often do you have a drink containing alcohol? 0 Filed at: 06/23/2024 0741   2. How many drinks containing alcohol do you have on a typical day you are drinking?  0 Filed at: 06/23/2024 0741   3a. Male UNDER 65: How often do you have five or more drinks on one occasion? 0 Filed at: 06/23/2024 0741   Audit-C Score 0 Filed at: 06/23/2024 0741   VICKIE: How many times in the past year have you...    Used an illegal drug or used a prescription medication for non-medical reasons? Never Filed at: 06/23/2024 0741                      Medical Decision Making  Amount and/or Complexity of Data Reviewed  Labs: ordered. Decision-making details documented in ED Course.  Radiology: ordered and independent interpretation  performed. Decision-making details documented in ED Course.  ECG/medicine tests: ordered and independent interpretation performed. Decision-making details documented in ED Course.  Discussion of management or test interpretation with external provider(s): Differential diagnosis includes but not limited to STEMI, NSTEMI, PE, pneumonia, pneumothorax, musculoskeletal chest pain, costochondritis, gastritis, cholelithiasis, contusion, strain    Risk  Decision regarding hospitalization.             Disposition  Final diagnoses:   Palpitation   Chest pain   Elevated troponin I level     Time reflects when diagnosis was documented in both MDM as applicable and the Disposition within this note       Time User Action Codes Description Comment    6/23/2024  7:43 AM Visperas, Yoseph P Add [R07.89] Atypical chest pain     6/23/2024  7:44 AM Visperas, Yoseph P Add [R00.2] Palpitation     6/23/2024  8:42 AM Visperas, Yoseph P Modify [R00.2] Palpitation     6/23/2024  8:42 AM Visperas, Yoseph P Remove [R07.89] Atypical chest pain     6/23/2024  8:42 AM Visperas, Yoseph P Add [R07.9] Chest pain     6/23/2024  8:42 AM Visperas, Yoseph P Add [R79.89] Elevated troponin I level           ED Disposition       ED Disposition   Admit    Condition   Stable    Date/Time   Sun Jun 23, 2024 0912    Comment   Case was discussed with Dr. Ma and the patient's admission status was agreed to be Admission Status: observation status to the service of Dr. Honeycutt .               Follow-up Information       Follow up With Specialties Details Why Contact Pedro Harvey DO Family Medicine Call in 1 day  [address] [facility] [facility]  [address]  [facility] [address] [phone]              Patient's Medications    No medications on file       No discharge procedures on file.    PDMP Review       None            ED Provider  Electronically Signed by             Yoseph Ch MD  06/23/24 6459

## 2024-06-23 NOTE — ASSESSMENT & PLAN NOTE
"Transient chest discomfort \"somewhat sharp\" radiated to jaw , for about 7 minutes in setting of tachycardia/palpitation with HR measure > 200 BPM for about two hours   Most likely chest discomfort due to the tachycardia , less likely ischemic etiology   Cardiology consulted , input appreciated   See elevated troponin A&P   "

## 2024-06-23 NOTE — ASSESSMENT & PLAN NOTE
09/08/2023 06/15/2023         Cholesterol 209 High     214 High       Triglyceride 162 High     141   Cholesterol, HDL, Direct 42 52   Cholesterol, Non- High     162 High       Cholesterol, LDL, Calculated 135 High      134 High        CHOL/HDL Ratio 5.0 4     HLD, BMI 34.9, hx of tachycardia in setting of anxiety and panic attacks, and patient is concerned about family hx of cardiac disease   - will check A1c   - discussed given age life style modification, diet and exercise is first line recommendations   - the option of low-medium intensity statin is explained and patient prefers to start , ordered Lipitor 20 mg QD , possible side effects explained including myopathy and LFTs elevation , will check CMP in 1-2 weeks post discharge

## 2024-06-24 ENCOUNTER — APPOINTMENT (OUTPATIENT)
Dept: MRI IMAGING | Facility: HOSPITAL | Age: 24
End: 2024-06-24
Payer: COMMERCIAL

## 2024-06-24 ENCOUNTER — APPOINTMENT (OUTPATIENT)
Dept: NON INVASIVE DIAGNOSTICS | Facility: HOSPITAL | Age: 24
End: 2024-06-24
Payer: COMMERCIAL

## 2024-06-24 LAB
ALBUMIN SERPL BCG-MCNC: 4 G/DL (ref 3.5–5)
ALP SERPL-CCNC: 68 U/L (ref 34–104)
ALT SERPL W P-5'-P-CCNC: 17 U/L (ref 7–52)
AMPHETAMINES SERPL QL SCN: NEGATIVE
ANION GAP SERPL CALCULATED.3IONS-SCNC: 5 MMOL/L (ref 4–13)
AORTIC ROOT: 3.2 CM
APICAL FOUR CHAMBER EJECTION FRACTION: 71 %
ASCENDING AORTA: 2.6 CM
AST SERPL W P-5'-P-CCNC: 19 U/L (ref 13–39)
ATRIAL RATE: 84 BPM
ATRIAL RATE: 97 BPM
BARBITURATES UR QL: NEGATIVE
BASOPHILS # BLD AUTO: 0.03 THOUSANDS/ÂΜL (ref 0–0.1)
BASOPHILS NFR BLD AUTO: 0 % (ref 0–1)
BENZODIAZ UR QL: NEGATIVE
BILIRUB SERPL-MCNC: 0.45 MG/DL (ref 0.2–1)
BSA FOR ECHO PROCEDURE: 2.43 M2
BUN SERPL-MCNC: 11 MG/DL (ref 5–25)
CALCIUM SERPL-MCNC: 9.1 MG/DL (ref 8.4–10.2)
CHLORIDE SERPL-SCNC: 107 MMOL/L (ref 96–108)
CHOLEST SERPL-MCNC: 155 MG/DL
CO2 SERPL-SCNC: 26 MMOL/L (ref 21–32)
COCAINE UR QL: NEGATIVE
CREAT SERPL-MCNC: 0.83 MG/DL (ref 0.6–1.3)
CRP SERPL QL: 3 MG/L
E WAVE DECELERATION TIME: 254 MS
E/A RATIO: 2.08
EOSINOPHIL # BLD AUTO: 0.32 THOUSAND/ÂΜL (ref 0–0.61)
EOSINOPHIL NFR BLD AUTO: 4 % (ref 0–6)
ERYTHROCYTE [DISTWIDTH] IN BLOOD BY AUTOMATED COUNT: 12.6 % (ref 11.6–15.1)
ERYTHROCYTE [SEDIMENTATION RATE] IN BLOOD: 6 MM/HOUR (ref 0–14)
FENTANYL UR QL SCN: NEGATIVE
FRACTIONAL SHORTENING: 40 (ref 28–44)
GFR SERPL CREATININE-BSD FRML MDRD: 124 ML/MIN/1.73SQ M
GLUCOSE SERPL-MCNC: 92 MG/DL (ref 65–140)
HCT VFR BLD AUTO: 42 % (ref 36.5–49.3)
HDLC SERPL-MCNC: 36 MG/DL
HGB BLD-MCNC: 13.5 G/DL (ref 12–17)
HYDROCODONE UR QL SCN: NEGATIVE
IMM GRANULOCYTES # BLD AUTO: 0.03 THOUSAND/UL (ref 0–0.2)
IMM GRANULOCYTES NFR BLD AUTO: 0 % (ref 0–2)
INTERVENTRICULAR SEPTUM IN DIASTOLE (PARASTERNAL SHORT AXIS VIEW): 0.09 CM
INTERVENTRICULAR SEPTUM: 1.1 CM (ref 0.6–1.1)
LAAS-AP2: 13.1 CM2
LAAS-AP4: 12.4 CM2
LDLC SERPL CALC-MCNC: 92 MG/DL (ref 0–100)
LEFT ATRIUM SIZE: 3.3 CM
LEFT ATRIUM VOLUME (MOD BIPLANE): 27 ML
LEFT ATRIUM VOLUME INDEX (MOD BIPLANE): 11.2 ML/M2
LEFT INTERNAL DIMENSION IN SYSTOLE: 2.5 CM (ref 2.1–4)
LEFT VENTRICLE DIASTOLIC VOLUME (MOD BIPLANE): 51 ML
LEFT VENTRICLE DIASTOLIC VOLUME INDEX (MOD BIPLANE): 21 ML/M2
LEFT VENTRICLE SYSTOLIC VOLUME (MOD BIPLANE): 14 ML
LEFT VENTRICLE SYSTOLIC VOLUME INDEX (MOD BIPLANE): 5.8 ML/M2
LEFT VENTRICULAR INTERNAL DIMENSION IN DIASTOLE: 4.2 CM (ref 3.5–6)
LEFT VENTRICULAR POSTERIOR WALL IN END DIASTOLE: 1 CM
LEFT VENTRICULAR STROKE VOLUME: 58 ML
LV EF: 72 %
LVSV (TEICH): 58 ML
LYMPHOCYTES # BLD AUTO: 2.3 THOUSANDS/ÂΜL (ref 0.6–4.47)
LYMPHOCYTES NFR BLD AUTO: 30 % (ref 14–44)
MCH RBC QN AUTO: 28.2 PG (ref 26.8–34.3)
MCHC RBC AUTO-ENTMCNC: 32.1 G/DL (ref 31.4–37.4)
MCV RBC AUTO: 88 FL (ref 82–98)
METHADONE UR QL: NEGATIVE
MONOCYTES # BLD AUTO: 0.65 THOUSAND/ÂΜL (ref 0.17–1.22)
MONOCYTES NFR BLD AUTO: 9 % (ref 4–12)
MV E'TISSUE VEL-LAT: 12 CM/S
MV E'TISSUE VEL-SEP: 9 CM/S
MV PEAK A VEL: 0.38 M/S
MV PEAK E VEL: 79 CM/S
MV STENOSIS PRESSURE HALF TIME: 74 MS
MV VALVE AREA P 1/2 METHOD: 2.97
NEUTROPHILS # BLD AUTO: 4.29 THOUSANDS/ÂΜL (ref 1.85–7.62)
NEUTS SEG NFR BLD AUTO: 57 % (ref 43–75)
NONHDLC SERPL-MCNC: 119 MG/DL
NRBC BLD AUTO-RTO: 0 /100 WBCS
OPIATES UR QL SCN: NEGATIVE
OXYCODONE+OXYMORPHONE UR QL SCN: NEGATIVE
P AXIS: -1 DEGREES
P AXIS: 56 DEGREES
PCP UR QL: NEGATIVE
PLATELET # BLD AUTO: 236 THOUSANDS/UL (ref 149–390)
PMV BLD AUTO: 11.1 FL (ref 8.9–12.7)
POTASSIUM SERPL-SCNC: 4.2 MMOL/L (ref 3.5–5.3)
PR INTERVAL: 160 MS
PR INTERVAL: 170 MS
PROT SERPL-MCNC: 6.6 G/DL (ref 6.4–8.4)
QRS AXIS: 15 DEGREES
QRS AXIS: 37 DEGREES
QRSD INTERVAL: 94 MS
QRSD INTERVAL: 96 MS
QT INTERVAL: 332 MS
QT INTERVAL: 364 MS
QTC INTERVAL: 421 MS
QTC INTERVAL: 430 MS
RA PRESSURE ESTIMATED: 3 MMHG
RBC # BLD AUTO: 4.78 MILLION/UL (ref 3.88–5.62)
RIGHT ATRIUM AREA SYSTOLE A4C: 10 CM2
RIGHT VENTRICLE ID DIMENSION: 3 CM
SL CV LEFT ATRIUM LENGTH A2C: 5.3 CM
SL CV LV EF: 71
SL CV PED ECHO LEFT VENTRICLE DIASTOLIC VOLUME (MOD BIPLANE) 2D: 81 ML
SL CV PED ECHO LEFT VENTRICLE SYSTOLIC VOLUME (MOD BIPLANE) 2D: 23 ML
SODIUM SERPL-SCNC: 138 MMOL/L (ref 135–147)
T WAVE AXIS: 21 DEGREES
T WAVE AXIS: 31 DEGREES
THC UR QL: NEGATIVE
TRICUSPID ANNULAR PLANE SYSTOLIC EXCURSION: 1.9 CM
TRIGL SERPL-MCNC: 135 MG/DL
VENTRICULAR RATE: 84 BPM
VENTRICULAR RATE: 97 BPM
WBC # BLD AUTO: 7.62 THOUSAND/UL (ref 4.31–10.16)

## 2024-06-24 PROCEDURE — 99232 SBSQ HOSP IP/OBS MODERATE 35: CPT

## 2024-06-24 PROCEDURE — 85652 RBC SED RATE AUTOMATED: CPT

## 2024-06-24 PROCEDURE — A9585 GADOBUTROL INJECTION: HCPCS | Performed by: HOSPITALIST

## 2024-06-24 PROCEDURE — 80053 COMPREHEN METABOLIC PANEL: CPT | Performed by: STUDENT IN AN ORGANIZED HEALTH CARE EDUCATION/TRAINING PROGRAM

## 2024-06-24 PROCEDURE — 80061 LIPID PANEL: CPT

## 2024-06-24 PROCEDURE — 75561 CARDIAC MRI FOR MORPH W/DYE: CPT

## 2024-06-24 PROCEDURE — 85025 COMPLETE CBC W/AUTO DIFF WBC: CPT | Performed by: STUDENT IN AN ORGANIZED HEALTH CARE EDUCATION/TRAINING PROGRAM

## 2024-06-24 PROCEDURE — 86140 C-REACTIVE PROTEIN: CPT

## 2024-06-24 PROCEDURE — 80307 DRUG TEST PRSMV CHEM ANLYZR: CPT

## 2024-06-24 PROCEDURE — 93306 TTE W/DOPPLER COMPLETE: CPT

## 2024-06-24 RX ORDER — GADOBUTROL 604.72 MG/ML
24 INJECTION INTRAVENOUS
Status: COMPLETED | OUTPATIENT
Start: 2024-06-24 | End: 2024-06-24

## 2024-06-24 RX ORDER — METOPROLOL SUCCINATE 25 MG/1
25 TABLET, EXTENDED RELEASE ORAL DAILY
Status: DISCONTINUED | OUTPATIENT
Start: 2024-06-25 | End: 2024-06-25 | Stop reason: HOSPADM

## 2024-06-24 RX ORDER — LOSARTAN POTASSIUM 25 MG/1
25 TABLET ORAL DAILY
Status: DISCONTINUED | OUTPATIENT
Start: 2024-06-24 | End: 2024-06-25 | Stop reason: HOSPADM

## 2024-06-24 RX ADMIN — ATORVASTATIN CALCIUM 20 MG: 20 TABLET, FILM COATED ORAL at 17:23

## 2024-06-24 RX ADMIN — PROPRANOLOL HYDROCHLORIDE 60 MG: 60 CAPSULE, EXTENDED RELEASE ORAL at 09:37

## 2024-06-24 RX ADMIN — LITHIUM CARBONATE 900 MG: 450 TABLET ORAL at 21:42

## 2024-06-24 RX ADMIN — GADOBUTROL 24 ML: 604.72 INJECTION INTRAVENOUS at 18:19

## 2024-06-24 RX ADMIN — LOSARTAN POTASSIUM 25 MG: 25 TABLET, FILM COATED ORAL at 11:17

## 2024-06-24 RX ADMIN — ENOXAPARIN SODIUM 40 MG: 40 INJECTION SUBCUTANEOUS at 14:28

## 2024-06-24 RX ADMIN — VENLAFAXINE HYDROCHLORIDE 150 MG: 150 CAPSULE, EXTENDED RELEASE ORAL at 09:37

## 2024-06-24 RX ADMIN — CHLORPROMAZINE HYDROCHLORIDE 100 MG: 25 TABLET, FILM COATED ORAL at 21:43

## 2024-06-24 RX ADMIN — HYDROCORTISONE: 25 CREAM TOPICAL at 17:23

## 2024-06-24 RX ADMIN — VENLAFAXINE HYDROCHLORIDE 150 MG: 150 CAPSULE, EXTENDED RELEASE ORAL at 21:43

## 2024-06-24 RX ADMIN — HYDROCORTISONE: 25 CREAM TOPICAL at 09:38

## 2024-06-24 NOTE — UTILIZATION REVIEW
Initial Clinical Review    Admission: Date/Time/Statement:   Admission Orders (From admission, onward)       Ordered        06/23/24 0913  Place in Observation  Once                          Orders Placed This Encounter   Procedures    Place in Observation     Standing Status:   Standing     Number of Occurrences:   1     Order Specific Question:   Level of Care     Answer:   Med Surg [16]     ED Arrival Information       Expected   -    Arrival   6/23/2024 07:36    Acuity   Emergent              Means of arrival   Walk-In    Escorted by   Family Member    Service   Hospitalist    Admission type   Emergency              Arrival complaint   chest pain             Chief Complaint   Patient presents with    Chest Pain     Pt states around 0530 started with chest pain and palpitations.        Initial Presentation: 24 y.o. male to ED via walk-in from home  Present to ED with palpitation ~ 2 hours with HR measured > 200 , during which had transient chest discomfort described as sharp and somewhat radiated to jaw but lasted only for 7 minutes, then resolved but palpitation continued till ~ 7:30   PMHX: general anxiety disorder, depression, bipolar, panic attacks, HLD and reports family hx of cardiac disease.   Admitted to OBS with DX: Elevated troponin level   on exam: BP mildly elevated ~130's low 140's; TROP 1,661 / 612 / 392; Mg 1.8  PLAN: Given the reported tachycardia and panic attacks events will restart propranolol at low dose for now to help with both; tele monitoring; rec'd Mg Sulf iv x1; f/u echo; f/u lithium level; trend TROPS; serial EKG to check Qtc; decrease effexor dose to 150 mg QD; rec'd asa 324 mg x1; cardiology consult; f/u A1c      Anticipated Length of Stay/Certification Statement: Patient will be admitted on an inpatient basis with an anticipated length of stay of greater than 2 midnights secondary to Arrhythmia / elevated troponin , cardiology evaluation         ED Triage Vitals   Temperature Pulse  Respirations Blood Pressure SpO2 Pain Score   06/23/24 0738 06/23/24 0738 06/23/24 0738 06/23/24 0738 06/23/24 0745 06/23/24 0954   (!) 97.4 °F (36.3 °C) 104 18 136/89 99 % No Pain     Weight (last 2 days)       Date/Time Weight    06/24/24 07:18:40 120 (265)    06/23/24 1000 120 (265)    06/23/24 0738 123 (270.95)            Vital Signs (last 3 days)       Date/Time Temp Pulse Resp BP MAP (mmHg) SpO2 O2 Device Patient Position - Orthostatic VS [place] Coma Scale Score Pain    06/24/24 0809 -- -- -- -- -- -- None (Room air) -- 15 No Pain    06/24/24 07:18:40 97 °F (36.1 °C) 72 18 128/65 86 98 % -- -- -- --    06/24/24 0300 97.8 °F (36.6 °C) -- -- 131/68 89 -- -- -- -- --    06/23/24 2151 97.7 °F (36.5 °C) 68 -- 139/71 94 96 % -- -- -- --    06/23/24 2130 -- -- -- -- -- 97 % None (Room air) -- -- No Pain    06/23/24 18:37:09 97.3 °F (36.3 °C) 70 -- 140/70 93 98 % -- -- -- --    06/23/24 15:08:54 97.7 °F (36.5 °C) 80 -- 133/74 94 97 % -- -- -- --    06/23/24 11:11:57 97.5 °F (36.4 °C) 71 18 128/71 90 98 % -- -- -- --    06/23/24 1000 -- -- -- -- -- -- None (Room air) -- 15 No Pain    06/23/24 09:55:41 97.3 °F (36.3 °C) 85 18 145/86 106 98 % -- -- -- --    06/23/24 0954 -- -- -- -- -- -- -- -- -- No Pain    06/23/24 0915 -- 93 20 133/80 101 99 % None (Room air) Lying -- --    06/23/24 0830 -- -- -- -- -- -- None (Room air) -- 15 --    06/23/24 0745 -- 96 20 140/91 110 99 % None (Room air) Lying -- --    06/23/24 0738 97.4 °F (36.3 °C) 104 18 136/89 -- -- -- Sitting -- --              Pertinent Labs/Diagnostic Test Results:   Radiology:  XR chest 1 view portable   ED Interpretation by Yoseph Ch MD (06/23 2211)   NAD      Final Interpretation by Merissa Rodriguez MD (06/23 0822)      No acute cardiopulmonary disease.            Workstation performed: [id number]             Results from last 7 days   Lab Units 06/24/24  0521 06/23/24  0744   WBC Thousand/uL 7.62 9.88   HEMOGLOBIN g/dL 13.5 14.0   HEMATOCRIT %  42.0 43.1   PLATELETS Thousands/uL 236 266   TOTAL NEUT ABS Thousands/µL 4.29 6.28       Results from last 7 days   Lab Units 06/24/24  0521 06/23/24  0744   SODIUM mmol/L 138 139   POTASSIUM mmol/L 4.2 4.5   CHLORIDE mmol/L 107 106   CO2 mmol/L 26 25   ANION GAP mmol/L 5 8   BUN mg/dL 11 16   CREATININE mg/dL 0.83 1.18   EGFR ml/min/1.73sq m 124 86   CALCIUM mg/dL 9.1 9.4   MAGNESIUM mg/dL  --  1.8*     Results from last 7 days   Lab Units 06/24/24  0521 06/23/24  0744   AST U/L 19 22   ALT U/L 17 17   ALK PHOS U/L 68 76   TOTAL PROTEIN g/dL 6.6 7.3   ALBUMIN g/dL 4.0 4.4   TOTAL BILIRUBIN mg/dL 0.45 0.21       Results from last 7 days   Lab Units 06/24/24  0521 06/23/24  0744   GLUCOSE RANDOM mg/dL 92 96       Results from last 7 days   Lab Units 06/23/24  0744   HEMOGLOBIN A1C % 5.4   EAG mg/dl 108       Results from last 7 days   Lab Units 06/23/24  0744   CK TOTAL U/L 210     Results from last 7 days   Lab Units 06/23/24  1148 06/23/24  0913 06/23/24  0744   HS TNI 0HR ng/L  --   --  392*   HS TNI 2HR ng/L  --  612*  --    HSTNI D2 ng/L  --  220*  --    HS TNI 4HR ng/L 1,661*  --   --    HSTNI D4 ng/L 1,269*  --   --      Results from last 7 days   Lab Units 06/23/24  0744   D-DIMER QUANTITATIVE ug/ml FEU <0.27     Results from last 7 days   Lab Units 06/23/24  0744   PROTIME seconds 12.7   INR  0.92   PTT seconds 23     Results from last 7 days   Lab Units 06/23/24  0744   TSH 3RD GENERATON uIU/mL 4.429       Results from last 7 days   Lab Units 06/23/24  0744   LACTIC ACID mmol/L 1.1       Results from last 7 days   Lab Units 06/23/24  0744   BNP pg/mL 6       Results from last 7 days   Lab Units 06/23/24  0744   LIPASE u/L 20     Results from last 7 days   Lab Units 06/24/24  0521   CRP mg/L 3.0*   SED RATE mm/hour 6         Past Medical History:   Diagnosis Date    Bipolar 1 disorder, mixed (HCC)        Admitting Diagnosis: Palpitation [R00.2]  Chest pain [R07.9]  Elevated troponin I level  [R79.89]    Age/Sex: 24 y.o. male    Admission Orders: SCDs; tele monitoring; I/O; regular diet    Scheduled Medications:  atorvastatin, 20 mg, Oral, Daily With Dinner  chlorproMAZINE, 100 mg, Oral, HS  enoxaparin, 40 mg, Subcutaneous, Daily  hydrocortisone, , Topical, BID  lithium carbonate, 900 mg, Oral, HS  propranolol, 60 mg, Oral, Daily  venlafaxine, 150 mg, Oral, BID    magnesium sulfate IVPB (premix) SOLN 1 g  Dose: 1 g  Freq: Once Route: IV  Start: 06/23/24 1300 End: 06/23/24 1414    Continuous IV Infusions: None       PRN Meds:  clonazePAM, 0.5 mg, Oral, BID PRN  metoprolol, 5 mg, Intravenous, Q8H PRN        IP CONSULT TO CARDIOLOGY    Network Utilization Review Department  ATTENTION: Please call with any questions or concerns to [phone] and carefully listen to the prompts so that you are directed to the right person. All voicemails are confidential.   For Discharge needs, contact Care Management DC Support Team at [phone] opt. [phone]  Send all requests for admission clinical reviews, approved or denied determinations and any other requests to dedicated fax number below belonging to the campus where the patient is receiving treatment. List of dedicated fax numbers for the Facilities:  FACILITY NAME UR FAX NUMBER   ADMISSION DENIALS (Administrative/Medical Necessity) [phone]   DISCHARGE SUPPORT TEAM (NETWORK) [phone]   PARENT CHILD HEALTH (Maternity/NICU/Pediatrics) [phone]   [facility] [phone]   [facility] [phone]   [facility] [phone]   [facility] [phone]   [facility] [phone]   [facility] [phone]   [facility] [phone]   [facility] [phone]   [facility] [phone]   [facility]  [place] [phone]   [facility] [phone]   [facility] [phone]

## 2024-06-24 NOTE — PLAN OF CARE
Problem: PAIN - ADULT  Goal: Verbalizes/displays adequate comfort level or baseline comfort level  Description: Interventions:  - Encourage patient to monitor pain and request assistance  - Assess pain using appropriate pain scale  - Administer analgesics based on type and severity of pain and evaluate response  - Implement non-pharmacological measures as appropriate and evaluate response  - Consider cultural and social influences on pain and pain management  - Notify physician/advanced practitioner if interventions unsuccessful or patient reports new pain  Outcome: Progressing     Problem: INFECTION - ADULT  Goal: Absence or prevention of progression during hospitalization  Description: INTERVENTIONS:  - Assess and monitor for signs and symptoms of infection  - Monitor lab/diagnostic results  - Monitor all insertion sites, i.e. indwelling lines, tubes, and drains  - Monitor endotracheal if appropriate and nasal secretions for changes in amount and color  - [place] appropriate cooling/warming therapies per order  - Administer medications as ordered  - Instruct and encourage patient and family to use good hand hygiene technique  - Identify and instruct in appropriate isolation precautions for identified infection/condition  Outcome: Progressing     Problem: SAFETY ADULT  Goal: Patient will remain free of falls  Description: INTERVENTIONS:  - Educate patient/family on patient safety including physical limitations  - Instruct patient to call for assistance with activity   - Consult OT/PT to assist with strengthening/mobility   - Keep Call bell within reach  - Keep bed low and locked with side rails adjusted as appropriate  - Keep care items and personal belongings within reach  - Initiate and maintain comfort rounds  - Make Fall Risk Sign visible to staff  - Offer Toileting every 2 Hours, in advance of need  - Initiate/Maintain   alarm  - Obtain necessary fall risk management equipment:     - Apply yellow socks and  bracelet for high fall risk patients  - Consider moving patient to room near nurses station  Outcome: Progressing  Goal: Maintain or return to baseline ADL function  Description: INTERVENTIONS:  -  Assess patient's ability to carry out ADLs; assess patient's baseline for ADL function and identify physical deficits which impact ability to perform ADLs (bathing, care of mouth/teeth, toileting, grooming, dressing, etc.)  - Assess/evaluate cause of self-care deficits   - Assess range of motion  - Assess patient's mobility; develop plan if impaired  - Assess patient's need for assistive devices and provide as appropriate  - Encourage maximum independence but intervene and supervise when necessary  - Involve family in performance of ADLs  - Assess for home care needs following discharge   - Consider OT consult to assist with ADL evaluation and planning for discharge  - Provide patient education as appropriate  Outcome: Progressing  Goal: Maintains/Returns to pre admission functional level  Description: INTERVENTIONS:  - Perform AM-PAC 6 Click Basic Mobility/ Daily Activity assessment daily.  - Set and communicate daily mobility goal to care team and patient/family/caregiver.   - Collaborate with rehabilitation services on mobility goals if consulted  - Perform Range of Motion 3 times a day.  - Reposition patient every 2 hours.  - Dangle patient 3 times a day  - Stand patient 3 times a day  - Ambulate patient 3 times a day  - Out of bed to chair 3 times a day   - Out of bed for meals 3 times a day  - Out of bed for toileting  - Record patient progress and toleration of activity level   Outcome: Progressing     Problem: DISCHARGE PLANNING  Goal: Discharge to home or other facility with appropriate resources  Description: INTERVENTIONS:  - Identify barriers to discharge w/patient and caregiver  - Arrange for needed discharge resources and transportation as appropriate  - Identify discharge learning needs (meds, wound care,  etc.)  - Arrange for interpretive services to assist at discharge as needed  - Refer to Case Management Department for coordinating discharge planning if the patient needs post-hospital services based on physician/advanced practitioner order or complex needs related to functional status, cognitive ability, or social support system  Outcome: Progressing     Problem: Knowledge Deficit  Goal: Patient/family/caregiver demonstrates understanding of disease process, treatment plan, medications, and discharge instructions  Description: Complete learning assessment and assess knowledge base.  Interventions:  - Provide teaching at level of understanding  - Provide teaching via preferred learning methods  Outcome: Progressing     Problem: CARDIOVASCULAR - ADULT  Goal: Maintains optimal cardiac output and hemodynamic stability  Description: INTERVENTIONS:  - Monitor I/O, vital signs and rhythm  - Monitor for S/S and trends of decreased cardiac output  - Administer and titrate ordered vasoactive medications to optimize hemodynamic stability  - Assess quality of pulses, skin color and temperature  - Assess for signs of decreased coronary artery perfusion  - Instruct patient to report change in severity of symptoms  Outcome: Progressing  Goal: Absence of cardiac dysrhythmias or at baseline rhythm  Description: INTERVENTIONS:  - Continuous cardiac monitoring, vital signs, obtain 12 lead EKG if ordered  - Administer antiarrhythmic and heart rate control medications as ordered  - Monitor electrolytes and administer replacement therapy as ordered  Outcome: Progressing     Problem: MOBILITY - ADULT  Goal: Maintain or return to baseline ADL function  Description: INTERVENTIONS:  -  Assess patient's ability to carry out ADLs; assess patient's baseline for ADL function and identify physical deficits which impact ability to perform ADLs (bathing, care of mouth/teeth, toileting, grooming, dressing, etc.)  - Assess/evaluate cause of  self-care deficits   - Assess range of motion  - Assess patient's mobility; develop plan if impaired  - Assess patient's need for assistive devices and provide as appropriate  - Encourage maximum independence but intervene and supervise when necessary  - Involve family in performance of ADLs  - Assess for home care needs following discharge   - Consider OT consult to assist with ADL evaluation and planning for discharge  - Provide patient education as appropriate  Outcome: Progressing  Goal: Maintains/Returns to pre admission functional level  Description: INTERVENTIONS:  - Perform AM-PAC 6 Click Basic Mobility/ Daily Activity assessment daily.  - Set and communicate daily mobility goal to care team and patient/family/caregiver.   - Collaborate with rehabilitation services on mobility goals if consulted  - Perform Range of Motion 3 times a day.  - Reposition patient every 2 hours.  - Dangle patient 3 times a day  - Stand patient 3 times a day  - Ambulate patient 3 times a day  - Out of bed to chair 3 times a day   - Out of bed for meals 3 times a day  - Out of bed for toileting  - Record patient progress and toleration of activity level   Outcome: Progressing

## 2024-06-24 NOTE — PLAN OF CARE
Problem: PAIN - ADULT  Goal: Verbalizes/displays adequate comfort level or baseline comfort level  Description: Interventions:  - Encourage patient to monitor pain and request assistance  - Assess pain using appropriate pain scale  - Administer analgesics based on type and severity of pain and evaluate response  - Implement non-pharmacological measures as appropriate and evaluate response  - Consider cultural and social influences on pain and pain management  - Notify physician/advanced practitioner if interventions unsuccessful or patient reports new pain  Outcome: Progressing     Problem: INFECTION - ADULT  Goal: Absence or prevention of progression during hospitalization  Description: INTERVENTIONS:  - Assess and monitor for signs and symptoms of infection  - Monitor lab/diagnostic results  - Monitor all insertion sites, i.e. indwelling lines, tubes, and drains  - Monitor endotracheal if appropriate and nasal secretions for changes in amount and color  - [place] appropriate cooling/warming therapies per order  - Administer medications as ordered  - Instruct and encourage patient and family to use good hand hygiene technique  - Identify and instruct in appropriate isolation precautions for identified infection/condition  Outcome: Progressing     Problem: SAFETY ADULT  Goal: Patient will remain free of falls  Description: INTERVENTIONS:  - Educate patient/family on patient safety including physical limitations  - Instruct patient to call for assistance with activity   - Consult OT/PT to assist with strengthening/mobility   - Keep Call bell within reach  - Keep bed low and locked with side rails adjusted as appropriate  - Keep care items and personal belongings within reach  - Initiate and maintain comfort rounds  - Make Fall Risk Sign visible to staff  - Apply yellow socks and bracelet for high fall risk patients  - Consider moving patient to room near nurses station  Outcome: Progressing  Goal: Maintain or  return to baseline ADL function  Description: INTERVENTIONS:  -  Assess patient's ability to carry out ADLs; assess patient's baseline for ADL function and identify physical deficits which impact ability to perform ADLs (bathing, care of mouth/teeth, toileting, grooming, dressing, etc.)  - Assess/evaluate cause of self-care deficits   - Assess range of motion  - Assess patient's mobility; develop plan if impaired  - Assess patient's need for assistive devices and provide as appropriate  - Encourage maximum independence but intervene and supervise when necessary  - Involve family in performance of ADLs  - Assess for home care needs following discharge   - Consider OT consult to assist with ADL evaluation and planning for discharge  - Provide patient education as appropriate  Outcome: Progressing  Goal: Maintains/Returns to pre admission functional level  Description: INTERVENTIONS:  - Perform AM-PAC 6 Click Basic Mobility/ Daily Activity assessment daily.  - Set and communicate daily mobility goal to care team and patient/family/caregiver.   - Collaborate with rehabilitation services on mobility goals if consulted  - Record patient progress and toleration of activity level   Outcome: Progressing     Problem: DISCHARGE PLANNING  Goal: Discharge to home or other facility with appropriate resources  Description: INTERVENTIONS:  - Identify barriers to discharge w/patient and caregiver  - Arrange for needed discharge resources and transportation as appropriate  - Identify discharge learning needs (meds, wound care, etc.)  - Arrange for interpretive services to assist at discharge as needed  - Refer to Case Management Department for coordinating discharge planning if the patient needs post-hospital services based on physician/advanced practitioner order or complex needs related to functional status, cognitive ability, or social support system  Outcome: Progressing     Problem: Knowledge Deficit  Goal:  Patient/family/caregiver demonstrates understanding of disease process, treatment plan, medications, and discharge instructions  Description: Complete learning assessment and assess knowledge base.  Interventions:  - Provide teaching at level of understanding  - Provide teaching via preferred learning methods  Outcome: Progressing     Problem: CARDIOVASCULAR - ADULT  Goal: Maintains optimal cardiac output and hemodynamic stability  Description: INTERVENTIONS:  - Monitor I/O, vital signs and rhythm  - Monitor for S/S and trends of decreased cardiac output  - Administer and titrate ordered vasoactive medications to optimize hemodynamic stability  - Assess quality of pulses, skin color and temperature  - Assess for signs of decreased coronary artery perfusion  - Instruct patient to report change in severity of symptoms  Outcome: Progressing  Goal: Absence of cardiac dysrhythmias or at baseline rhythm  Description: INTERVENTIONS:  - Continuous cardiac monitoring, vital signs, obtain 12 lead EKG if ordered  - Administer antiarrhythmic and heart rate control medications as ordered  - Monitor electrolytes and administer replacement therapy as ordered  Outcome: Progressing     Problem: MOBILITY - ADULT  Goal: Maintain or return to baseline ADL function  Description: INTERVENTIONS:  -  Assess patient's ability to carry out ADLs; assess patient's baseline for ADL function and identify physical deficits which impact ability to perform ADLs (bathing, care of mouth/teeth, toileting, grooming, dressing, etc.)  - Assess/evaluate cause of self-care deficits   - Assess range of motion  - Assess patient's mobility; develop plan if impaired  - Assess patient's need for assistive devices and provide as appropriate  - Encourage maximum independence but intervene and supervise when necessary  - Involve family in performance of ADLs  - Assess for home care needs following discharge   - Consider OT consult to assist with ADL evaluation and  planning for discharge  - Provide patient education as appropriate  Outcome: Progressing  Goal: Maintains/Returns to pre admission functional level  Description: INTERVENTIONS:  - Perform AM-PAC 6 Click Basic Mobility/ Daily Activity assessment daily.  - Set and communicate daily mobility goal to care team and patient/family/caregiver.   - Record patient progress and toleration of activity level   Outcome: Progressing

## 2024-06-24 NOTE — PROGRESS NOTES
"[facility]  Progress Note  Name: Sukhi Blanca I  MRN: [id number]  Unit/Bed#: MS 340Lee I Date of Admission: 6/23/2024   Date of Service: 6/24/2024 I Hospital Day: 0    Assessment & Plan   * Elevated troponin level  Assessment & Plan   Latest Reference Range & Units 06/23/24 07:44 06/23/24 09:13 06/23/24 11:48   Total CK 39 - 308 U/L 210     hs TnI 0hr \"Refer to ACS Flowchart\"- see link ng/L 392 (H)     hs TnI 2hr \"Refer to ACS Flowchart\"- see link ng/L  612 (H)    Delta 2hr hsTnI <20 ng/L  220 (H)    hs TnI 4hr \"Refer to ACS Flowchart\"- see link ng/L   1,661 (H)   Delta 4hr hsTnI <20 ng/L   1,269 (H)   BNP 0 - 100 pg/mL 6       Most likely type II, non ischemic due to the palpitation / tachycardia with HR reportedly > 200 for ~ 2 hours prior to admission from 5:30 AM to about 7:30 AM per patient   Repeate EKG ~ 1 PM , NSR , no ST changes.   Explained while less likely but for completeness and given no hx of bleeding issue, s/p  mg x 1 dose until further Cardiology eval   ESR normal, mildly elevated crp   Will continue to monitor on Tele   Cardiology: suspect acute myocarditis presumed viral from gastroenteritis. Cardiac MRI to be done on 6/25. Stop propranolol, add toprol xl 25 mg qd, add losartan 25 mg qd, bmp tomorrow.     Tachycardia  Assessment & Plan  Hx of anxiety disorder with panic attacks ~ once every month , per patient  He used to be on Propranolol in the past which used to help with his HR and Anxiety symptoms , but about 2-3 years ago when he switched provider - as per patient - the med was stopped \"thought to be not helping much with the anxiety\"   Denies excessive coffee intake, \"only 1-2 Brisk iced tea a day\" , denies coffee or energy drinks use, denies drug abuse, non smoker.   TSH wnl   6/23 from ~ 5:30 am - ~ 7:30 am while was at his night shift at [employer] felt palpitation (lasted ~ 2 hours) with mild diaphoresis and transient chest discomfort described as " "\"somewhat sharp went toward jaw and last ~ 7 minutes then resolved but palpitation remained for two hours\"   In the ED and during admission now NSR 70-80's with BP mildly elevated ~130's low 140's ; Heart sounds normal , no murmur, no JVD, euvolemic on exam , CTAB   Patient is on multiple psych meds which is known to possibly cause arrhythmia and also has hx of panic attacks.   Given the reported tachycardia and panic attacks events will restart propranolol at low dose for now to help with both   continue to monitor on tele and potentially will need extended monitor, consult cardiology, input appreciated   TSH wnl   Echo: Left ventricular cavity size is normal. Wall thickness is normal. The left ventricular ejection fraction is 71%. Systolic function is hyperdynamic. Although no diagnostic regional wall motion abnormality was identified, this possibility cannot be completely excluded on the basis of this study. Diastolic function is normal. Left atrial filling pressure is normal.   Patient on Chloropromazine and lithium which can cause QT prolongation but of note Qtc 6/23 on repeate EKG is WNL   Lithium level <0.10 - patient endorses not taking this as consistently because he has been adjusting his work shifts and also with recently being sick. Will not adjust lithium dosing at this time.   On effexor 150 mg bid, will continue, monitor HR on telemetry as possible side effect is tachycardia and patient on higher dose.   holding off Cariprazine for now due to the known cardiotoxicity/arrhythmia side effects until further cards eval  Cardiology: stop propranolol, add toprol xl 25 mg qd, add losartan 25 mg qd, BMP tomorrow, monitor on telemetry, monitor lithium level on losartan. Cardiac MRI tomorrow due to possible concerns of myocarditis.     Irritant contact dermatitis due to metals  Assessment & Plan  Lower abdominal wall with rash x 2 weeks from [place] as per patient  Has been using OTC Hydrocortisone which \"was " "helping\" will continue while inpatient    Hypomagnesemia  Assessment & Plan  Mg 1.8 , repletion ordered 1 g     Chest discomfort  Assessment & Plan  Transient chest discomfort \"somewhat sharp\" radiated to jaw , for about 7 minutes in setting of tachycardia/palpitation with HR measure > 200 BPM for about two hours   Most likely chest discomfort due to the tachycardia , less likely ischemic etiology   Cardiology consulted , input appreciated   See elevated troponin A&P     Mixed hyperlipidemia  Assessment & Plan    09/08/2023 06/15/2023         Cholesterol 209 High     214 High       Triglyceride 162 High     141   Cholesterol, HDL, Direct 42 52   Cholesterol, Non- High     162 High       Cholesterol, LDL, Calculated 135 High      134 High        CHOL/HDL Ratio 5.0 4     HLD, BMI 34.9, hx of tachycardia in setting of anxiety and panic attacks, and patient is concerned about family hx of cardiac disease   A1c 5.4  discussed given age life style modification, diet and exercise is first line recommendations   the option of low-medium intensity statin is explained and patient prefers to start , ordered Lipitor 20 mg QD , possible side effects explained including myopathy and LFTs elevation , will check CMP in 1-2 weeks post discharge    Generalized anxiety disorder with panic attacks  Assessment & Plan  Has hx of HARMONY with panic attacks \"once every month or so\"   Used to be on Propranolol about 3 years ago and uses PRN Klonopin, restarting /continuing both especially now with the reported tachycardia event on 6/23   To continue follow up with PCP and discuss referral to Behavioral Health / Psych Therapy as indicated  Mood is very stable and in no distress during admission   continue POA Lithium  Lithium level <0.10  continue Effexor   hold off Cariprazine for now given suspected side effect/arrhythmia - will discuss with cardiology regarding resuming med  continue POA Chlorpromazine   Started on propranolol, but d/c " and switched to toprol XL  PRN Klonopin            VTE Pharmacologic Prophylaxis:   Moderate Risk (Score 3-4) - Pharmacological DVT Prophylaxis Ordered: enoxaparin (Lovenox).    Mobility:   Basic Mobility Inpatient Raw Score: 24  JH-HLM Goal: 8: Walk 250 feet or more  JH-HLM Achieved: 7: Walk 25 feet or more  JH-HLM Goal NOT achieved. Continue with multidisciplinary rounding and encourage appropriate mobility to improve upon JH-HLM goals.    Patient Centered Rounds: I performed bedside rounds with nursing staff today.   Discussions with Specialists or Other Care Team Provider: nursing, case management    Education and Discussions with Family / Patient: Updated  (mother) at bedside.    Total Time Spent on Date of Encounter in care of patient:  mins. This time was spent on one or more of the following: performing physical exam; counseling and coordination of care; obtaining or reviewing history; documenting in the medical record; reviewing/ordering tests, medications or procedures; communicating with other healthcare professionals and discussing with patient's family/caregivers.    Current Length of Stay: 0 day(s)  Current Patient Status: Observation   Certification Statement: The patient will continue to require additional inpatient hospital stay due to cardiac mri, possible myocarditis  Discharge Plan: Anticipate discharge in 24-48 hrs to home.    Code Status: Level 1 - Full Code    Subjective:   Seen and examined today. States that he is doing well today. Offers no current complaints of chest pain or SOB. Said that he hasn't had any chest pain since     Objective:     Vitals:   Temp (24hrs), Av.5 °F (36.4 °C), Min:97 °F (36.1 °C), Max:97.8 °F (36.6 °C)    Temp:  [97 °F (36.1 °C)-97.8 °F (36.6 °C)] 97.3 °F (36.3 °C)  HR:  [66-80] 66  Resp:  [18-20] 20  BP: (125-140)/(65-74) 125/68  SpO2:  [96 %-98 %] 98 %  Body mass index is 34.96 kg/m².     Input and Output Summary (last 24 hours):      Intake/Output Summary (Last 24 hours) at 6/24/2024 1258  Last data filed at 6/24/2024 0718  Gross per 24 hour   Intake 180 ml   Output --   Net 180 ml       Physical Exam:   Physical Exam  Vitals reviewed.   Constitutional:       General: He is not in acute distress.     Appearance: He is not ill-appearing or toxic-appearing.   HENT:      Head: Normocephalic and atraumatic.      Mouth/Throat:      Mouth: Mucous membranes are moist.   Cardiovascular:      Rate and Rhythm: Normal rate and regular rhythm.      Heart sounds: No murmur heard.  Pulmonary:      Effort: No respiratory distress.      Breath sounds: No stridor. No wheezing, rhonchi or rales.   Chest:      Chest wall: No tenderness.   Abdominal:      General: Bowel sounds are normal. There is no distension.      Palpations: Abdomen is soft. There is no mass.      Tenderness: There is no abdominal tenderness.   Musculoskeletal:      Right lower leg: No edema.      Left lower leg: No edema.   Skin:     General: Skin is warm and dry.   Neurological:      General: No focal deficit present.      Mental Status: He is alert and oriented to person, place, and time.   Psychiatric:         Mood and Affect: Mood normal.         Behavior: Behavior normal.          Additional Data:     Labs:  Results from last 7 days   Lab Units 06/24/24  0521   WBC Thousand/uL 7.62   HEMOGLOBIN g/dL 13.5   HEMATOCRIT % 42.0   PLATELETS Thousands/uL 236   SEGS PCT % 57   LYMPHO PCT % 30   MONO PCT % 9   EOS PCT % 4     Results from last 7 days   Lab Units 06/24/24  0521   SODIUM mmol/L 138   POTASSIUM mmol/L 4.2   CHLORIDE mmol/L 107   CO2 mmol/L 26   BUN mg/dL 11   CREATININE mg/dL 0.83   ANION GAP mmol/L 5   CALCIUM mg/dL 9.1   ALBUMIN g/dL 4.0   TOTAL BILIRUBIN mg/dL 0.45   ALK PHOS U/L 68   ALT U/L 17   AST U/L 19   GLUCOSE RANDOM mg/dL 92     Results from last 7 days   Lab Units 06/23/24  0744   INR  0.92         Results from last 7 days   Lab Units 06/23/24  0744   HEMOGLOBIN A1C  % 5.4     Results from last 7 days   Lab Units 06/23/24  0744   LACTIC ACID mmol/L 1.1       Lines/Drains:  Invasive Devices       Peripheral Intravenous Line  Duration             Peripheral IV 06/23/24 Left Antecubital 1 day                      Telemetry:  Telemetry Orders (From admission, onward)               24 Hour Telemetry Monitoring  Continuous x 24 Hours (Telem)        Question:  Reason for 24 Hour Telemetry  Answer:  Arrhythmias requiring acute medical intervention / PPM or ICD malfunction                     Telemetry Reviewed: Normal Sinus Rhythm  Indication for Continued Telemetry Use: Edi/arnold/endocarditis             Imaging: Reviewed radiology reports from this admission including: chest xray    Recent Cultures (last 7 days):         Last 24 Hours Medication List:   Current Facility-Administered Medications   Medication Dose Route Frequency Provider Last Rate    atorvastatin  20 mg Oral Daily With Dinner Grant Aiad, DO      chlorproMAZINE  100 mg Oral HS Grant Aiad, DO      clonazePAM  0.5 mg Oral BID PRN Grant Aiad, DO      enoxaparin  40 mg Subcutaneous Daily Grant Aiad, DO      hydrocortisone   Topical BID Grant Aiad, DO      lithium carbonate  900 mg Oral HS Grant Aiad, DO      losartan  25 mg Oral Daily Tracy Crowe PA-C      metoprolol  5 mg Intravenous Q8H PRN Grant Aiad, DO      [START ON 6/25/2024] metoprolol succinate  25 mg Oral Daily Tracy Crowe PA-C      venlafaxine  150 mg Oral BID Grant Aiad, DO          Today, Patient Was Seen By: Palmira Fountain PA-C    **Please Note: This note may have been constructed using a voice recognition system.**

## 2024-06-24 NOTE — CONSULTS
Consultation - Cardiology   Sukhi Blanca 24 y.o. male MRN: [id number]  Unit/Bed#: -01 Encounter: [id number]    Assessment & Plan     Assessment:  Elevated troponin, CP, normal LVEF   - recent GI illness   - high level of concern for acute myocarditis (presumed viral from gastroenteritis)   - Cardiac MRI to be done 6/25/2024   - sed rate normal   - CRP 3   - on propranolol due to underlying anxiety  Anxiety - on lithium at baseline  HLD     Plan:  Stop Propranolol  Add Toprol xl 25 mg PO daily  Add Losartan 25 mg daily   BMP tomorrow  Monitor on telemetry   Monitor lithium level on Losartan due to concerns with toxicity   Pending cardiac MRI tomorrow    History of Present Illness   Physician Requesting Consult: Ti Escudero Counts, DO  Reason for Consult / Principal Problem: cp, elevated troponin   HPI: Sukhi Blanca is a 24 y.o. year old male with long withstanding psychiatric history on lithium presented to the hospital yesterday for concerns with CP and palpitations that started while he was at work. He states about 1 week ago he got food poisoning from eating something at a gas station. That illness caused him to have diarrhea and vomiting that lasted a day. He states he went to work yesterday and then felt extremely tired. When he was standing talking to a co worker, he developed acute onset of chest pressure, palpitations and jaw pain. He states it was very sharp and lasted for about 1.5 hours. He decided to come to the hospital but by the time he got here his symptoms resolved. EKG was normal. Troponins elevated. Echocardiogram done showing grossly normal wall motions and LVEF is normal. He otherwise has no cardiac history and no fhx of cardiac issues. Due to his history of anxiety he was started on propranolol. He has not had any events on telemetry now that he is here.    He does report smoking marijuana but no other drug use  No fhx of early CAD.     Inpatient consult to Cardiology  Consult  "performed by: Tracy Crowe PA-C  Consult ordered by: Grant Aceves DO          Review of Systems   Constitutional:  Negative for chills, fatigue and unexpected weight change.   Respiratory:  Positive for chest tightness. Negative for shortness of breath.    Cardiovascular:  Positive for chest pain and palpitations. Negative for leg swelling.   Gastrointestinal:  Negative for nausea.   Skin:  Negative for color change and pallor.   Neurological:  Positive for weakness. Negative for dizziness.   Psychiatric/Behavioral:  Negative for agitation, behavioral problems and confusion.        Historical Information   Past Medical History:   Diagnosis Date    Bipolar 1 disorder, mixed (HCC)      Past Surgical History:   Procedure Laterality Date    TYMPANOSTOMY TUBE PLACEMENT       Social History     Substance and Sexual Activity   Alcohol Use Not Currently    Comment: socially     Social History     Substance and Sexual Activity   Drug Use Never     E-Cigarette/Vaping     E-Cigarette/Vaping Substances     Social History     Tobacco Use   Smoking Status Never   Smokeless Tobacco Never     Family History: non-contributory    Meds/Allergies   all current active meds have been reviewed  No Known Allergies    Objective   Vitals: Blood pressure 128/65, pulse 72, temperature (!) 97 °F (36.1 °C), resp. rate 18, height 6' 1\" (1.854 m), weight 120 kg (265 lb), SpO2 98%.  Orthostatic Blood Pressures      Flowsheet Row Most Recent Value   Blood Pressure 128/65 filed at 06/24/2024 0718   Patient Position - Orthostatic VS Lying filed at 06/23/2024 0915              Intake/Output Summary (Last 24 hours) at 6/24/2024 1019  Last data filed at 6/24/2024 0718  Gross per 24 hour   Intake 320 ml   Output --   Net 320 ml       Invasive Devices       Peripheral Intravenous Line  Duration             Peripheral IV 06/23/24 Left Antecubital 1 day                    Physical Exam  Vitals and nursing note reviewed.   Constitutional:       " Appearance: Normal appearance.   HENT:      Head: Normocephalic and atraumatic.   Cardiovascular:      Rate and Rhythm: Normal rate.      Heart sounds: No murmur heard.  Pulmonary:      Effort: Pulmonary effort is normal.      Breath sounds: No wheezing.   Abdominal:      Palpations: Abdomen is soft.   Musculoskeletal:         General: No swelling.      Cervical back: Neck supple.   Skin:     General: Skin is warm and dry.      Capillary Refill: Capillary refill takes less than 2 seconds.   Neurological:      General: No focal deficit present.      Mental Status: He is alert and oriented to person, place, and time.         Lab Results: I have personally reviewed pertinent lab results.    CBC with diff:   Results from last 7 days   Lab Units 06/24/24  0521   WBC Thousand/uL 7.62   RBC Million/uL 4.78   HEMOGLOBIN g/dL 13.5   HEMATOCRIT % 42.0   MCV fL 88   MCH pg 28.2   MCHC g/dL 32.1   RDW % 12.6   MPV fL 11.1   PLATELETS Thousands/uL 236     CMP:   Results from last 7 days   Lab Units 06/24/24  0521   SODIUM mmol/L 138   CHLORIDE mmol/L 107   CO2 mmol/L 26   BUN mg/dL 11   CREATININE mg/dL 0.83   CALCIUM mg/dL 9.1   AST U/L 19   ALT U/L 17   ALK PHOS U/L 68   EGFR ml/min/1.73sq m 124     HS Troponin:   0   Lab Value Date/Time    HSTNI0 392 (H) 06/23/2024 0744    HSTNI2 612 (H) 06/23/2024 0913    HSTNI4 1,661 (H) 06/23/2024 1148     BNP:   Results from last 7 days   Lab Units 06/24/24  0521   POTASSIUM mmol/L 4.2   CHLORIDE mmol/L 107   CO2 mmol/L 26   BUN mg/dL 11   CREATININE mg/dL 0.83   CALCIUM mg/dL 9.1   EGFR ml/min/1.73sq m 124     Coags:   Results from last 7 days   Lab Units 06/23/24  0744   PTT seconds 23   INR  0.92     TSH:   Results from last 7 days   Lab Units 06/23/24  0744   TSH 3RD GENERATON uIU/mL 4.429     Magnesium:   Results from last 7 days   Lab Units 06/23/24  0744   MAGNESIUM mg/dL 1.8*     Lipid Profile:   Results from last 7 days   Lab Units 06/24/24  0521   HDL mg/dL 36*   LDL CALC mg/dL  92   TRIGLYCERIDES mg/dL 135     Imaging: I have personally reviewed pertinent reports.    EKG: nsr no ischemic changes  Echo 6/24/2024:    Left Ventricle: Left ventricular cavity size is normal. Wall thickness is normal. The left ventricular ejection fraction is 71%. Systolic function is hyperdynamic. Although no diagnostic regional wall motion abnormality was identified, this possibility cannot be completely excluded on the basis of this study. Diastolic function is normal.  Left atrial filling pressure is normal.    Right Ventricle: Right ventricular cavity size is normal. Systolic function is normal.    Tricuspid Valve: Right ventricular systolic pressure could not be assessed.    IVC/SVC: The right atrial pressure is estimated at 3.0 mmHg. The inferior vena cava is normal in

## 2024-06-25 VITALS
SYSTOLIC BLOOD PRESSURE: 116 MMHG | OXYGEN SATURATION: 99 % | WEIGHT: 265 LBS | RESPIRATION RATE: 16 BRPM | HEIGHT: 73 IN | HEART RATE: 68 BPM | TEMPERATURE: 97.5 F | DIASTOLIC BLOOD PRESSURE: 58 MMHG | BODY MASS INDEX: 35.12 KG/M2

## 2024-06-25 LAB
ALBUMIN SERPL BCG-MCNC: 3.8 G/DL (ref 3.5–5)
ALP SERPL-CCNC: 64 U/L (ref 34–104)
ALT SERPL W P-5'-P-CCNC: 17 U/L (ref 7–52)
ANION GAP SERPL CALCULATED.3IONS-SCNC: 4 MMOL/L (ref 4–13)
AST SERPL W P-5'-P-CCNC: 16 U/L (ref 13–39)
BILIRUB SERPL-MCNC: 0.48 MG/DL (ref 0.2–1)
BUN SERPL-MCNC: 10 MG/DL (ref 5–25)
CALCIUM SERPL-MCNC: 9.1 MG/DL (ref 8.4–10.2)
CARDIAC TROPONIN I PNL SERPL HS: 427 NG/L (ref 8–18)
CHLORIDE SERPL-SCNC: 106 MMOL/L (ref 96–108)
CO2 SERPL-SCNC: 28 MMOL/L (ref 21–32)
CREAT SERPL-MCNC: 0.83 MG/DL (ref 0.6–1.3)
ERYTHROCYTE [DISTWIDTH] IN BLOOD BY AUTOMATED COUNT: 12.6 % (ref 11.6–15.1)
GFR SERPL CREATININE-BSD FRML MDRD: 124 ML/MIN/1.73SQ M
GLUCOSE SERPL-MCNC: 94 MG/DL (ref 65–140)
HCT VFR BLD AUTO: 40.4 % (ref 36.5–49.3)
HGB BLD-MCNC: 13.4 G/DL (ref 12–17)
MAGNESIUM SERPL-MCNC: 2 MG/DL (ref 1.9–2.7)
MCH RBC QN AUTO: 28.8 PG (ref 26.8–34.3)
MCHC RBC AUTO-ENTMCNC: 33.2 G/DL (ref 31.4–37.4)
MCV RBC AUTO: 87 FL (ref 82–98)
PLATELET # BLD AUTO: 248 THOUSANDS/UL (ref 149–390)
PMV BLD AUTO: 10.7 FL (ref 8.9–12.7)
POTASSIUM SERPL-SCNC: 4.3 MMOL/L (ref 3.5–5.3)
PROT SERPL-MCNC: 6.5 G/DL (ref 6.4–8.4)
RBC # BLD AUTO: 4.66 MILLION/UL (ref 3.88–5.62)
SODIUM SERPL-SCNC: 138 MMOL/L (ref 135–147)
WBC # BLD AUTO: 8.44 THOUSAND/UL (ref 4.31–10.16)

## 2024-06-25 PROCEDURE — 99239 HOSP IP/OBS DSCHRG MGMT >30: CPT

## 2024-06-25 PROCEDURE — 80053 COMPREHEN METABOLIC PANEL: CPT

## 2024-06-25 PROCEDURE — 83735 ASSAY OF MAGNESIUM: CPT

## 2024-06-25 PROCEDURE — 85027 COMPLETE CBC AUTOMATED: CPT

## 2024-06-25 PROCEDURE — 84484 ASSAY OF TROPONIN QUANT: CPT

## 2024-06-25 RX ORDER — ATORVASTATIN CALCIUM 20 MG/1
20 TABLET, FILM COATED ORAL
Qty: 30 TABLET | Refills: 0 | Status: SHIPPED | OUTPATIENT
Start: 2024-06-25

## 2024-06-25 RX ORDER — LOSARTAN POTASSIUM 25 MG/1
25 TABLET ORAL DAILY
Qty: 30 TABLET | Refills: 0 | Status: SHIPPED | OUTPATIENT
Start: 2024-06-26

## 2024-06-25 RX ORDER — METOPROLOL SUCCINATE 25 MG/1
25 TABLET, EXTENDED RELEASE ORAL DAILY
Qty: 30 TABLET | Refills: 0 | Status: SHIPPED | OUTPATIENT
Start: 2024-06-26

## 2024-06-25 RX ADMIN — HYDROCORTISONE: 25 CREAM TOPICAL at 09:19

## 2024-06-25 RX ADMIN — METOPROLOL SUCCINATE 25 MG: 25 TABLET, EXTENDED RELEASE ORAL at 09:19

## 2024-06-25 RX ADMIN — VENLAFAXINE HYDROCHLORIDE 150 MG: 150 CAPSULE, EXTENDED RELEASE ORAL at 09:19

## 2024-06-25 RX ADMIN — LOSARTAN POTASSIUM 25 MG: 25 TABLET, FILM COATED ORAL at 09:19

## 2024-06-25 NOTE — PLAN OF CARE
Problem: PAIN - ADULT  Goal: Verbalizes/displays adequate comfort level or baseline comfort level  Description: Interventions:  - Encourage patient to monitor pain and request assistance  - Assess pain using appropriate pain scale  - Administer analgesics based on type and severity of pain and evaluate response  - Implement non-pharmacological measures as appropriate and evaluate response  - Consider cultural and social influences on pain and pain management  - Notify physician/advanced practitioner if interventions unsuccessful or patient reports new pain  Outcome: Progressing     Problem: INFECTION - ADULT  Goal: Absence or prevention of progression during hospitalization  Description: INTERVENTIONS:  - Assess and monitor for signs and symptoms of infection  - Monitor lab/diagnostic results  - Monitor all insertion sites, i.e. indwelling lines, tubes, and drains  - Monitor endotracheal if appropriate and nasal secretions for changes in amount and color  - [place] appropriate cooling/warming therapies per order  - Administer medications as ordered  - Instruct and encourage patient and family to use good hand hygiene technique  - Identify and instruct in appropriate isolation precautions for identified infection/condition  Outcome: Progressing     Problem: SAFETY ADULT  Goal: Patient will remain free of falls  Description: INTERVENTIONS:  - Educate patient/family on patient safety including physical limitations  - Instruct patient to call for assistance with activity   - Consult OT/PT to assist with strengthening/mobility   - Keep Call bell within reach  - Keep bed low and locked with side rails adjusted as appropriate  - Keep care items and personal belongings within reach  - Initiate and maintain comfort rounds  - Make Fall Risk Sign visible to staff  - Offer Toileting every 2 Hours, in advance of need  - Initiate/Maintain   alarm  - Obtain necessary fall risk management equipment:     - Apply yellow socks and  bracelet for high fall risk patients  - Consider moving patient to room near nurses station  Outcome: Progressing  Goal: Maintain or return to baseline ADL function  Description: INTERVENTIONS:  -  Assess patient's ability to carry out ADLs; assess patient's baseline for ADL function and identify physical deficits which impact ability to perform ADLs (bathing, care of mouth/teeth, toileting, grooming, dressing, etc.)  - Assess/evaluate cause of self-care deficits   - Assess range of motion  - Assess patient's mobility; develop plan if impaired  - Assess patient's need for assistive devices and provide as appropriate  - Encourage maximum independence but intervene and supervise when necessary  - Involve family in performance of ADLs  - Assess for home care needs following discharge   - Consider OT consult to assist with ADL evaluation and planning for discharge  - Provide patient education as appropriate  Outcome: Progressing  Goal: Maintains/Returns to pre admission functional level  Description: INTERVENTIONS:  - Perform AM-PAC 6 Click Basic Mobility/ Daily Activity assessment daily.  - Set and communicate daily mobility goal to care team and patient/family/caregiver.   - Collaborate with rehabilitation services on mobility goals if consulted  - Perform Range of Motion 3 times a day.  - Reposition patient every 2 hours.  - Dangle patient 3 times a day  - Stand patient 3 times a day  - Ambulate patient 3 times a day  - Out of bed to chair 3 times a day   - Out of bed for meals 3 times a day  - Out of bed for toileting  - Record patient progress and toleration of activity level   Outcome: Progressing     Problem: DISCHARGE PLANNING  Goal: Discharge to home or other facility with appropriate resources  Description: INTERVENTIONS:  - Identify barriers to discharge w/patient and caregiver  - Arrange for needed discharge resources and transportation as appropriate  - Identify discharge learning needs (meds, wound care,  etc.)  - Arrange for interpretive services to assist at discharge as needed  - Refer to Case Management Department for coordinating discharge planning if the patient needs post-hospital services based on physician/advanced practitioner order or complex needs related to functional status, cognitive ability, or social support system  Outcome: Progressing     Problem: Knowledge Deficit  Goal: Patient/family/caregiver demonstrates understanding of disease process, treatment plan, medications, and discharge instructions  Description: Complete learning assessment and assess knowledge base.  Interventions:  - Provide teaching at level of understanding  - Provide teaching via preferred learning methods  Outcome: Progressing     Problem: CARDIOVASCULAR - ADULT  Goal: Maintains optimal cardiac output and hemodynamic stability  Description: INTERVENTIONS:  - Monitor I/O, vital signs and rhythm  - Monitor for S/S and trends of decreased cardiac output  - Administer and titrate ordered vasoactive medications to optimize hemodynamic stability  - Assess quality of pulses, skin color and temperature  - Assess for signs of decreased coronary artery perfusion  - Instruct patient to report change in severity of symptoms  Outcome: Progressing  Goal: Absence of cardiac dysrhythmias or at baseline rhythm  Description: INTERVENTIONS:  - Continuous cardiac monitoring, vital signs, obtain 12 lead EKG if ordered  - Administer antiarrhythmic and heart rate control medications as ordered  - Monitor electrolytes and administer replacement therapy as ordered  Outcome: Progressing     Problem: MOBILITY - ADULT  Goal: Maintain or return to baseline ADL function  Description: INTERVENTIONS:  -  Assess patient's ability to carry out ADLs; assess patient's baseline for ADL function and identify physical deficits which impact ability to perform ADLs (bathing, care of mouth/teeth, toileting, grooming, dressing, etc.)  - Assess/evaluate cause of  self-care deficits   - Assess range of motion  - Assess patient's mobility; develop plan if impaired  - Assess patient's need for assistive devices and provide as appropriate  - Encourage maximum independence but intervene and supervise when necessary  - Involve family in performance of ADLs  - Assess for home care needs following discharge   - Consider OT consult to assist with ADL evaluation and planning for discharge  - Provide patient education as appropriate  Outcome: Progressing  Goal: Maintains/Returns to pre admission functional level  Description: INTERVENTIONS:  - Perform AM-PAC 6 Click Basic Mobility/ Daily Activity assessment daily.  - Set and communicate daily mobility goal to care team and patient/family/caregiver.   - Collaborate with rehabilitation services on mobility goals if consulted  - Perform Range of Motion 3 times a day.  - Reposition patient every 2 hours.  - Dangle patient 3 times a day  - Stand patient 3 times a day  - Ambulate patient 3 times a day  - Out of bed to chair 3 times a day   - Out of bed for meals 3 times a day  - Out of bed for toileting  - Record patient progress and toleration of activity level   Outcome: Progressing

## 2024-06-25 NOTE — PLAN OF CARE
Problem: PAIN - ADULT  Goal: Verbalizes/displays adequate comfort level or baseline comfort level  Description: Interventions:  - Encourage patient to monitor pain and request assistance  - Assess pain using appropriate pain scale  - Administer analgesics based on type and severity of pain and evaluate response  - Implement non-pharmacological measures as appropriate and evaluate response  - Consider cultural and social influences on pain and pain management  - Notify physician/advanced practitioner if interventions unsuccessful or patient reports new pain  Outcome: Progressing     Problem: INFECTION - ADULT  Goal: Absence or prevention of progression during hospitalization  Description: INTERVENTIONS:  - Assess and monitor for signs and symptoms of infection  - Monitor lab/diagnostic results  - Monitor all insertion sites, i.e. indwelling lines, tubes, and drains  - Monitor endotracheal if appropriate and nasal secretions for changes in amount and color  - [place] appropriate cooling/warming therapies per order  - Administer medications as ordered  - Instruct and encourage patient and family to use good hand hygiene technique  - Identify and instruct in appropriate isolation precautions for identified infection/condition  Outcome: Progressing     Problem: SAFETY ADULT  Goal: Patient will remain free of falls  Description: INTERVENTIONS:  - Educate patient/family on patient safety including physical limitations  - Instruct patient to call for assistance with activity   - Consult OT/PT to assist with strengthening/mobility   - Keep Call bell within reach  - Keep bed low and locked with side rails adjusted as appropriate  - Keep care items and personal belongings within reach  - Initiate and maintain comfort rounds  - Make Fall Risk Sign visible to staff  - Apply yellow socks and bracelet for high fall risk patients  - Consider moving patient to room near nurses station  Outcome: Progressing  Goal: Maintain or  return to baseline ADL function  Description: INTERVENTIONS:  -  Assess patient's ability to carry out ADLs; assess patient's baseline for ADL function and identify physical deficits which impact ability to perform ADLs (bathing, care of mouth/teeth, toileting, grooming, dressing, etc.)  - Assess/evaluate cause of self-care deficits   - Assess range of motion  - Assess patient's mobility; develop plan if impaired  - Assess patient's need for assistive devices and provide as appropriate  - Encourage maximum independence but intervene and supervise when necessary  - Involve family in performance of ADLs  - Assess for home care needs following discharge   - Consider OT consult to assist with ADL evaluation and planning for discharge  - Provide patient education as appropriate  Outcome: Progressing  Goal: Maintains/Returns to pre admission functional level  Description: INTERVENTIONS:  - Perform AM-PAC 6 Click Basic Mobility/ Daily Activity assessment daily.  - Set and communicate daily mobility goal to care team and patient/family/caregiver.   - Collaborate with rehabilitation services on mobility goals if consulted  - Record patient progress and toleration of activity level   Outcome: Progressing     Problem: DISCHARGE PLANNING  Goal: Discharge to home or other facility with appropriate resources  Description: INTERVENTIONS:  - Identify barriers to discharge w/patient and caregiver  - Arrange for needed discharge resources and transportation as appropriate  - Identify discharge learning needs (meds, wound care, etc.)  - Arrange for interpretive services to assist at discharge as needed  - Refer to Case Management Department for coordinating discharge planning if the patient needs post-hospital services based on physician/advanced practitioner order or complex needs related to functional status, cognitive ability, or social support system  Outcome: Progressing     Problem: Knowledge Deficit  Goal:  Patient/family/caregiver demonstrates understanding of disease process, treatment plan, medications, and discharge instructions  Description: Complete learning assessment and assess knowledge base.  Interventions:  - Provide teaching at level of understanding  - Provide teaching via preferred learning methods  Outcome: Progressing     Problem: CARDIOVASCULAR - ADULT  Goal: Maintains optimal cardiac output and hemodynamic stability  Description: INTERVENTIONS:  - Monitor I/O, vital signs and rhythm  - Monitor for S/S and trends of decreased cardiac output  - Administer and titrate ordered vasoactive medications to optimize hemodynamic stability  - Assess quality of pulses, skin color and temperature  - Assess for signs of decreased coronary artery perfusion  - Instruct patient to report change in severity of symptoms  Outcome: Progressing  Goal: Absence of cardiac dysrhythmias or at baseline rhythm  Description: INTERVENTIONS:  - Continuous cardiac monitoring, vital signs, obtain 12 lead EKG if ordered  - Administer antiarrhythmic and heart rate control medications as ordered  - Monitor electrolytes and administer replacement therapy as ordered  Outcome: Progressing     Problem: MOBILITY - ADULT  Goal: Maintain or return to baseline ADL function  Description: INTERVENTIONS:  -  Assess patient's ability to carry out ADLs; assess patient's baseline for ADL function and identify physical deficits which impact ability to perform ADLs (bathing, care of mouth/teeth, toileting, grooming, dressing, etc.)  - Assess/evaluate cause of self-care deficits   - Assess range of motion  - Assess patient's mobility; develop plan if impaired  - Assess patient's need for assistive devices and provide as appropriate  - Encourage maximum independence but intervene and supervise when necessary  - Involve family in performance of ADLs  - Assess for home care needs following discharge   - Consider OT consult to assist with ADL evaluation and  planning for discharge  - Provide patient education as appropriate  Outcome: Progressing  Goal: Maintains/Returns to pre admission functional level  Description: INTERVENTIONS:  - Perform AM-PAC 6 Click Basic Mobility/ Daily Activity assessment daily.  - Set and communicate daily mobility goal to care team and patient/family/caregiver.   - Collaborate with rehabilitation services on mobility goals if consulted  - Record patient progress and toleration of activity level   Outcome: Progressing

## 2024-06-25 NOTE — ASSESSMENT & PLAN NOTE
"Has hx of HARMONY with panic attacks \"once every month or so\"   Used to be on Propranolol about 3 years ago and uses PRN Klonopin, restarting /continuing both especially now with the reported tachycardia event on 6/23   To continue follow up with PCP and discuss referral to Behavioral Health / Psych Therapy as indicated  Mood is very stable and in no distress during admission   continue POA Lithium  Lithium level <0.10  continue Effexor   Can resume Cariprazine - discussed with cardiology, can resume all home psych meds at home doses.   continue POA Chlorpromazine   Started on propranolol, but d/c and switched to toprol XL  PRN Klonopin   "

## 2024-06-25 NOTE — PLAN OF CARE
Problem: PAIN - ADULT  Goal: Verbalizes/displays adequate comfort level or baseline comfort level  Description: Interventions:  - Encourage patient to monitor pain and request assistance  - Assess pain using appropriate pain scale  - Administer analgesics based on type and severity of pain and evaluate response  - Implement non-pharmacological measures as appropriate and evaluate response  - Consider cultural and social influences on pain and pain management  - Notify physician/advanced practitioner if interventions unsuccessful or patient reports new pain  6/25/2024 1209 by Gail Núñez LPN  Outcome: Adequate for Discharge  6/25/2024 0934 by Gail Núñez LPN  Outcome: Progressing     Problem: INFECTION - ADULT  Goal: Absence or prevention of progression during hospitalization  Description: INTERVENTIONS:  - Assess and monitor for signs and symptoms of infection  - Monitor lab/diagnostic results  - Monitor all insertion sites, i.e. indwelling lines, tubes, and drains  - Monitor endotracheal if appropriate and nasal secretions for changes in amount and color  - [place] appropriate cooling/warming therapies per order  - Administer medications as ordered  - Instruct and encourage patient and family to use good hand hygiene technique  - Identify and instruct in appropriate isolation precautions for identified infection/condition  6/25/2024 1209 by Gail Núñez LPN  Outcome: Adequate for Discharge  6/25/2024 0934 by Gail Núñez LPN  Outcome: Progressing     Problem: SAFETY ADULT  Goal: Patient will remain free of falls  Description: INTERVENTIONS:  - Educate patient/family on patient safety including physical limitations  - Instruct patient to call for assistance with activity   - Consult OT/PT to assist with strengthening/mobility   - Keep Call bell within reach  - Keep bed low and locked with side rails adjusted as appropriate  - Keep care items and personal belongings within reach  - Initiate and  maintain comfort rounds  - Make Fall Risk Sign visible to staff  - Offer Toileting every 2 Hours, in advance of need  - Initiate/Maintain   alarm  - Obtain necessary fall risk management equipment:     - Apply yellow socks and bracelet for high fall risk patients  - Consider moving patient to room near nurses station  6/25/2024 1209 by Gail Núñez LPN  Outcome: Adequate for Discharge  6/25/2024 0934 by Gail Núñez LPN  Outcome: Progressing  Goal: Maintain or return to baseline ADL function  Description: INTERVENTIONS:  -  Assess patient's ability to carry out ADLs; assess patient's baseline for ADL function and identify physical deficits which impact ability to perform ADLs (bathing, care of mouth/teeth, toileting, grooming, dressing, etc.)  - Assess/evaluate cause of self-care deficits   - Assess range of motion  - Assess patient's mobility; develop plan if impaired  - Assess patient's need for assistive devices and provide as appropriate  - Encourage maximum independence but intervene and supervise when necessary  - Involve family in performance of ADLs  - Assess for home care needs following discharge   - Consider OT consult to assist with ADL evaluation and planning for discharge  - Provide patient education as appropriate  6/25/2024 1209 by Gail Núñez LPN  Outcome: Adequate for Discharge  6/25/2024 0934 by Gail Núñez LPN  Outcome: Progressing  Goal: Maintains/Returns to pre admission functional level  Description: INTERVENTIONS:  - Perform AM-PAC 6 Click Basic Mobility/ Daily Activity assessment daily.  - Set and communicate daily mobility goal to care team and patient/family/caregiver.   - Collaborate with rehabilitation services on mobility goals if consulted  - Perform Range of Motion 3 times a day.  - Reposition patient every 2 hours.  - Dangle patient 3 times a day  - Stand patient 3 times a day  - Ambulate patient 3 times a day  - Out of bed to chair 3 times a day   - Out of bed for  meals 3 times a day  - Out of bed for toileting  - Record patient progress and toleration of activity level   6/25/2024 1209 by Gail Núñez LPN  Outcome: Adequate for Discharge  6/25/2024 0934 by Gail Núñez LPN  Outcome: Progressing     Problem: DISCHARGE PLANNING  Goal: Discharge to home or other facility with appropriate resources  Description: INTERVENTIONS:  - Identify barriers to discharge w/patient and caregiver  - Arrange for needed discharge resources and transportation as appropriate  - Identify discharge learning needs (meds, wound care, etc.)  - Arrange for interpretive services to assist at discharge as needed  - Refer to Case Management Department for coordinating discharge planning if the patient needs post-hospital services based on physician/advanced practitioner order or complex needs related to functional status, cognitive ability, or social support system  6/25/2024 1209 by Gail Núñez LPN  Outcome: Adequate for Discharge  6/25/2024 0934 by Gail Núñez LPN  Outcome: Progressing     Problem: Knowledge Deficit  Goal: Patient/family/caregiver demonstrates understanding of disease process, treatment plan, medications, and discharge instructions  Description: Complete learning assessment and assess knowledge base.  Interventions:  - Provide teaching at level of understanding  - Provide teaching via preferred learning methods  6/25/2024 1209 by Gail Núñez LPN  Outcome: Adequate for Discharge  6/25/2024 0934 by Gail Núñez LPN  Outcome: Progressing     Problem: CARDIOVASCULAR - ADULT  Goal: Maintains optimal cardiac output and hemodynamic stability  Description: INTERVENTIONS:  - Monitor I/O, vital signs and rhythm  - Monitor for S/S and trends of decreased cardiac output  - Administer and titrate ordered vasoactive medications to optimize hemodynamic stability  - Assess quality of pulses, skin color and temperature  - Assess for signs of decreased coronary artery  perfusion  - Instruct patient to report change in severity of symptoms  6/25/2024 1209 by Gail Núñez LPN  Outcome: Adequate for Discharge  6/25/2024 0934 by Gail Núñez LPN  Outcome: Progressing  Goal: Absence of cardiac dysrhythmias or at baseline rhythm  Description: INTERVENTIONS:  - Continuous cardiac monitoring, vital signs, obtain 12 lead EKG if ordered  - Administer antiarrhythmic and heart rate control medications as ordered  - Monitor electrolytes and administer replacement therapy as ordered  6/25/2024 1209 by Gail Núñez LPN  Outcome: Adequate for Discharge  6/25/2024 0934 by Gail Núñez LPN  Outcome: Progressing     Problem: MOBILITY - ADULT  Goal: Maintain or return to baseline ADL function  Description: INTERVENTIONS:  -  Assess patient's ability to carry out ADLs; assess patient's baseline for ADL function and identify physical deficits which impact ability to perform ADLs (bathing, care of mouth/teeth, toileting, grooming, dressing, etc.)  - Assess/evaluate cause of self-care deficits   - Assess range of motion  - Assess patient's mobility; develop plan if impaired  - Assess patient's need for assistive devices and provide as appropriate  - Encourage maximum independence but intervene and supervise when necessary  - Involve family in performance of ADLs  - Assess for home care needs following discharge   - Consider OT consult to assist with ADL evaluation and planning for discharge  - Provide patient education as appropriate  6/25/2024 1209 by Gail Núñez LPN  Outcome: Adequate for Discharge  6/25/2024 0934 by Gail Núñez LPN  Outcome: Progressing  Goal: Maintains/Returns to pre admission functional level  Description: INTERVENTIONS:  - Perform AM-PAC 6 Click Basic Mobility/ Daily Activity assessment daily.  - Set and communicate daily mobility goal to care team and patient/family/caregiver.   - Collaborate with rehabilitation services on mobility goals if consulted  -  Perform Range of Motion 3 times a day.  - Reposition patient every 2 hours.  - Dangle patient 3 times a day  - Stand patient 3 times a day  - Ambulate patient 3 times a day  - Out of bed to chair 3 times a day   - Out of bed for meals 3 times a day  - Out of bed for toileting  - Record patient progress and toleration of activity level   6/25/2024 1209 by Gail Núñez LPN  Outcome: Adequate for Discharge  6/25/2024 0934 by Gail Núñez LPN  Outcome: Progressing

## 2024-06-25 NOTE — PROGRESS NOTES
"Progress Note - Cardiology   Sukhi Blanca 24 y.o. male MRN: [id number]  Unit/Bed#: -01 Encounter: [id number]    Assessment:  Elevated troponin- likely Myocarditis secondary to recent GI illness   - cardiac MRI without evidence of Myocarditis but does not exclude diagnosis    - troponin level trending down    - on BB and Ace inhibitor now   - CP resolved  Anxiety   HLD    Plan:  Would be appropriate to go back to work 6/29/2024.  Continue BB and Ace (6 months minimum)  I can fill out FMLA request as an OP  Will coordinate fu    Subjective/Objective     Subjective: pt feels well. No symptoms overnight    Objective: no events on telemetry. Cardiac MRI back and no abnormalities seen. Troponin level repeated and trending down    Vitals: /58   Pulse 68   Temp 97.5 °F (36.4 °C)   Resp 16   Ht 6' 1\" (1.854 m)   Wt 120 kg (265 lb)   SpO2 99%   BMI 34.96 kg/m²   Vitals:    06/23/24 1000 06/24/24 0718   Weight: 120 kg (265 lb) 120 kg (265 lb)     Orthostatic Blood Pressures      Flowsheet Row Most Recent Value   Blood Pressure 116/58 filed at 06/25/2024 0921   Patient Position - Orthostatic VS Lying filed at 06/23/2024 0915              Intake/Output Summary (Last 24 hours) at 6/25/2024 0955  Last data filed at 6/25/2024 0824  Gross per 24 hour   Intake 680 ml   Output --   Net 680 ml       Invasive Devices       Peripheral Intravenous Line  Duration             Peripheral IV 06/23/24 Left Antecubital 2 days                                Physical Exam  Vitals and nursing note reviewed.   Constitutional:       Appearance: Normal appearance.   HENT:      Head: Normocephalic and atraumatic.   Cardiovascular:      Rate and Rhythm: Normal rate.      Heart sounds: No murmur heard.  Pulmonary:      Effort: Pulmonary effort is normal.      Breath sounds: No wheezing.   Abdominal:      Palpations: Abdomen is soft.   Musculoskeletal:         General: No swelling.      Cervical back: Neck supple.   Skin:     " General: Skin is warm and dry.      Capillary Refill: Capillary refill takes less than 2 seconds.   Neurological:      General: No focal deficit present.      Mental Status: He is alert and oriented to person, place, and time.           Lab Results: I have personally reviewed pertinent lab results.    CBC with diff:   Results from last 7 days   Lab Units 06/25/24  0445   WBC Thousand/uL 8.44   RBC Million/uL 4.66   HEMOGLOBIN g/dL 13.4   HEMATOCRIT % 40.4   MCV fL 87   MCH pg 28.8   MCHC g/dL 33.2   RDW % 12.6   MPV fL 10.7   PLATELETS Thousands/uL 248     CMP:   Results from last 7 days   Lab Units 06/25/24  0445   SODIUM mmol/L 138   CHLORIDE mmol/L 106   CO2 mmol/L 28   BUN mg/dL 10   CREATININE mg/dL 0.83   CALCIUM mg/dL 9.1   AST U/L 16   ALT U/L 17   ALK PHOS U/L 64   EGFR ml/min/1.73sq m 124     HS Troponin:   0   Lab Value Date/Time    HSTNI 427 (H) 06/25/2024 1055    HSTNI0 392 (H) 06/23/2024 0744    HSTNI2 612 (H) 06/23/2024 0913    HSTNI4 1,661 (H) 06/23/2024 1148     BNP:   Results from last 7 days   Lab Units 06/25/24  0445   POTASSIUM mmol/L 4.3   CHLORIDE mmol/L 106   CO2 mmol/L 28   BUN mg/dL 10   CREATININE mg/dL 0.83   CALCIUM mg/dL 9.1   EGFR ml/min/1.73sq m 124     Coags:   Results from last 7 days   Lab Units 06/23/24  0744   PTT seconds 23   INR  0.92     TSH:   Results from last 7 days   Lab Units 06/23/24  0744   TSH 3RD GENERATON uIU/mL 4.429     Magnesium:   Results from last 7 days   Lab Units 06/25/24  0445   MAGNESIUM mg/dL 2.0     Lipid Profile:   Results from last 7 days   Lab Units 06/24/24  0521   HDL mg/dL 36*   LDL CALC mg/dL 92   TRIGLYCERIDES mg/dL 135     Imaging: I have personally reviewed pertinent reports.    EKG: no events on telemetry     Echo 6/24/2024:    Left Ventricle: Left ventricular cavity size is normal. Wall thickness is normal. The left ventricular ejection fraction is 71%. Systolic function is hyperdynamic. Although no diagnostic regional wall motion abnormality  was identified, this possibility cannot be completely excluded on the basis of this study. Diastolic function is normal.  Left atrial filling pressure is normal.    Right Ventricle: Right ventricular cavity size is normal. Systolic function is normal.    Tricuspid Valve: Right ventricular systolic pressure could not be assessed.    IVC/SVC: The right atrial pressure is estimated at 3.0 mmHg. The inferior vena cava is normal in size. Respirophasic changes were normal.    cardiac MRI 6/24/2024:    Findings:  1. The left ventricle is normal in size with normal wall thickness.  Left ventricular systolic function is normal with a measured ejection fraction of 58%.  There are no regional left ventricular wall motion abnormalities.  2. The right ventricle is normal in size with normal right ventricular systolic function.  3. The aortic, mitral, and tricuspid valves open without restriction.  There is no significant valvular regurgitation, however cine MRI is inaccurate in the qualitative assessment of valvular regurgitation.  4. The left atrium is normal in size. The aortic root is normal in size.  5. There is no evidence of myocardial edema.  6. Delayed post-gadolinium imaging demonstrates no region of hyperenhancement.     IMPRESSION:  Impression:  1. Normal left and right ventricular size and systolic function.  2. No significant valvular abnormalities.  3. No evidence of myocardial scarring, inflammation, or infiltrative disease.  4. No obvious cardiac MRI findings suggestive of myocarditis.  However, this does not exclude the diagnosis of myocarditis.

## 2024-06-25 NOTE — DISCHARGE INSTR - AVS FIRST PAGE
Dear Sukhi Blanca,     It was our pleasure to care for you here at [facility]. For follow up as well as any medication refills, we recommend that you follow up with your primary care physician. Here are the most important instructions/ recommendations at discharge:     Notable Medication Adjustments -   Can resume taking your home medications as prescribed  Start taking toprol XL 25 mg daily for 6 months  Start taking losartan 25 mg daily for 6 months - please recheck your lithium levels outpatient with your PCP as losartan can affect your lithium levels.   Start taking lipitor 20 mg daily  Start using hydrocortisone cream as needed for contact dermatitis of skin  Important follow up information -   Follow up with PCP in 1 week  Follow up with cardiology outpatient  Follow up with psychiatry outpatient  Other Instructions -   Continue taking medications as prescribed. You can return to work on 6/29/2024 per cardiology recommendations. Please return to the hospital if you have any new or worsening symptoms.   Please take your [place] paperwork to [facility] cardiology office to be completed.   Please review this entire after visit summary as additional general instructions including medication list, appointments, activity, diet, any pertinent wound care, and other additional recommendations from your care team that may be provided for you.      Sincerely,     Palmira Fountain PA-C

## 2024-06-25 NOTE — DISCHARGE SUMMARY
"[facility]  Discharge- Sukhi Blanca 2000, 24 y.o. male MRN: [id number]  Unit/Bed#: -Chance Encounter: [id number]  Primary Care Provider: Caren Harvey DO   Date and time admitted to hospital: 6/23/2024  7:37 AM    * Elevated troponin level  Assessment & Plan   Latest Reference Range & Units 06/23/24 07:44 06/23/24 09:13 06/23/24 11:48   Total CK 39 - 308 U/L 210     hs TnI 0hr \"Refer to ACS Flowchart\"- see link ng/L 392 (H)     hs TnI 2hr \"Refer to ACS Flowchart\"- see link ng/L  612 (H)    Delta 2hr hsTnI <20 ng/L  220 (H)    hs TnI 4hr \"Refer to ACS Flowchart\"- see link ng/L   1,661 (H)   Delta 4hr hsTnI <20 ng/L   1,269 (H)   BNP 0 - 100 pg/mL 6       Most likely type II, non ischemic due to the palpitation / tachycardia with HR reportedly > 200 for ~ 2 hours prior to admission from 5:30 AM to about 7:30 AM per patient   Repeate EKG ~ 1 PM , NSR , no ST changes.   Explained while less likely but for completeness and given no hx of bleeding issue, s/p  mg x 1 dose until further Cardiology eval   ESR normal, mildly elevated crp   Will continue to monitor on Tele   Cardiology: suspect acute myocarditis presumed viral from gastroenteritis. Cardiac MRI to be done on 6/25. Stop propranolol, add toprol xl 25 mg qd, add losartan 25 mg qd, bmp tomorrow.   Cardiac MRI normal  Continue toprol xl 25 mg, losartan 25 mg qd, outpatient cardiology follow up. Can continue all psych meds on discharge. Can continue work as normal. No strenuous exercise.     Tachycardia  Assessment & Plan  Hx of anxiety disorder with panic attacks ~ once every month , per patient  He used to be on Propranolol in the past which used to help with his HR and Anxiety symptoms , but about 2-3 years ago when he switched provider - as per patient - the med was stopped \"thought to be not helping much with the anxiety\"   Denies excessive coffee intake, \"only 1-2 Brisk iced tea a day\" , denies coffee or " "energy drinks use, denies drug abuse, non smoker.   TSH wnl   6/23 from ~ 5:30 am - ~ 7:30 am while was at his night shift at [employer] felt palpitation (lasted ~ 2 hours) with mild diaphoresis and transient chest discomfort described as \"somewhat sharp went toward jaw and last ~ 7 minutes then resolved but palpitation remained for two hours\"   In the ED and during admission now NSR 70-80's with BP mildly elevated ~130's low 140's ; Heart sounds normal , no murmur, no JVD, euvolemic on exam , CTAB   Patient is on multiple psych meds which is known to possibly cause arrhythmia and also has hx of panic attacks.   Given the reported tachycardia and panic attacks events will restart propranolol at low dose for now to help with both   continue to monitor on tele and potentially will need extended monitor, consult cardiology, input appreciated   TSH wnl   Echo: Left ventricular cavity size is normal. Wall thickness is normal. The left ventricular ejection fraction is 71%. Systolic function is hyperdynamic. Although no diagnostic regional wall motion abnormality was identified, this possibility cannot be completely excluded on the basis of this study. Diastolic function is normal. Left atrial filling pressure is normal.   Patient on Chloropromazine and lithium which can cause QT prolongation but of note Qtc 6/23 on repeate EKG is WNL   Lithium level <0.10 - patient endorses not taking this as consistently because he has been adjusting his work shifts and also with recently being sick. Will not adjust lithium dosing at this time.   On effexor 150 mg bid, will continue, monitor HR on telemetry as possible side effect is tachycardia and patient on higher dose.   Discussed with cardiology, can resume Cariprazine  Cardiology: stop propranolol, add toprol xl 25 mg qd, add losartan 25 mg qd, BMP tomorrow, monitor on telemetry, monitor lithium level on losartan. Cardiac MRI tomorrow due to possible concerns of myocarditis. " "  Continue toprol XL 25 mg qd, continue losartan 25 mg qd. Can go back to work.     Irritant contact dermatitis due to metals  Assessment & Plan  Lower abdominal wall with rash x 2 weeks from [place] as per patient  Has been using OTC Hydrocortisone which \"was helping\" will continue while inpatient    Hypomagnesemia  Assessment & Plan  Mg 1.8 , repletion ordered 1 g     Chest discomfort  Assessment & Plan  Transient chest discomfort \"somewhat sharp\" radiated to jaw , for about 7 minutes in setting of tachycardia/palpitation with HR measure > 200 BPM for about two hours   Most likely chest discomfort due to the tachycardia , less likely ischemic etiology   Cardiology consulted , input appreciated   See elevated troponin A&P     Mixed hyperlipidemia  Assessment & Plan    09/08/2023 06/15/2023         Cholesterol 209 High     214 High       Triglyceride 162 High     141   Cholesterol, HDL, Direct 42 52   Cholesterol, Non- High     162 High       Cholesterol, LDL, Calculated 135 High      134 High        CHOL/HDL Ratio 5.0 4     HLD, BMI 34.9, hx of tachycardia in setting of anxiety and panic attacks, and patient is concerned about family hx of cardiac disease   A1c 5.4  discussed given age life style modification, diet and exercise is first line recommendations   the option of low-medium intensity statin is explained and patient prefers to start , ordered Lipitor 20 mg QD , possible side effects explained including myopathy and LFTs elevation , will check CMP in 1-2 weeks post discharge    Generalized anxiety disorder with panic attacks  Assessment & Plan  Has hx of HARMONY with panic attacks \"once every month or so\"   Used to be on Propranolol about 3 years ago and uses PRN Klonopin, restarting /continuing both especially now with the reported tachycardia event on 6/23   To continue follow up with PCP and discuss referral to Behavioral Health / Psych Therapy as indicated  Mood is very stable and in no distress " during admission   continue POA Lithium  Lithium level <0.10  continue Effexor   Can resume Cariprazine - discussed with cardiology, can resume all home psych meds at home doses.   continue POA Chlorpromazine   Started on propranolol, but d/c and switched to toprol XL  PRN Klonopin       Medical Problems       Resolved Problems  Date Reviewed: 6/25/2024   None       Discharging Physician / Practitioner: Palmira Fountain PA-C  PCP: Caren Harvey DO  Admission Date:   Admission Orders (From admission, onward)       Ordered        06/23/24 0913  Place in Observation  Once                          Discharge Date: 06/25/24    Consultations During Hospital Stay:  Cardiology    Procedures Performed:   none    Significant Findings / Test Results:   MRI cardiac  w wo contrast   Final Result by Ángel Hein MD (06/25 1023)   Impression:   1. Normal left and right ventricular size and systolic function.   2. No significant valvular abnormalities.   3. No evidence of myocardial scarring, inflammation, or infiltrative disease.   4. No obvious cardiac MRI findings suggestive of myocarditis.  However, this does not exclude the diagnosis of myocarditis.         Workstation performed: [id number]         XR chest 1 view portable   ED Interpretation by Yoseph Ch MD (06/23 0750)   NAD      Final Result by Merissa Rodriguez MD (06/23 0814)      No acute cardiopulmonary disease.            Workstation performed: [id number]           Echo: Left Ventricle: Left ventricular cavity size is normal. Wall thickness is normal. The left ventricular ejection fraction is 71%. Systolic function is hyperdynamic. Although no diagnostic regional wall motion abnormality was identified, this possibility cannot be completely excluded on the basis of this study. Diastolic function is normal.  Left atrial filling pressure is normal. Right Ventricle: Right ventricular cavity size is normal. Systolic function is normal. Tricuspid Valve:  Right ventricular systolic pressure could not be assessed. IVC/SVC: The right atrial pressure is estimated at 3.0 mmHg. The inferior vena cava is normal in size. Respirophasic changes were normal.  Rapid UDS negative  Crp: 3.0  Esr: 6  Lipid panel: cholesterol: 155, triglycerides: 135, HDL: 36, LDL: 92  Lithium level: <0.10  A1c: 5.4  CK: 210  TSH: 4.429  BNP: 6  D-dimer: normal  Lactic normal  Troponin: 392 > 612 > 1661    Incidental Findings:   none     Test Results Pending at Discharge (will require follow up):   none     Outpatient Tests Requested:  Follow up with cardiology   Follow up with PCP in 1 week  Follow up with psych outpatient regarding psych meds    Complications:  none    Reason for Admission: elevated troponins, chest pain, tachycardia    Hospital Course:   Sukhi Blanca is a 24 y.o. male patient who originally presented to the hospital on 6/23/2024 due to chest pain and elevated heart rate. In the ED, found to have elevated troponin. ED provider discussed with cardiology and recommended monitoring troponins and workup for cause aside from ACS for chest pain and elevated troponins. Placed on telemetry and troponins continued to elevated, but chest pain resolved. Started on low dose propranolol due to tachycardia and panic attacks. Echo ordered with results above. Did order loading dose of aspirin due to elevated troponins and chest pain. Held some of his psych meds until cardiology evaluated him and cardiology felt safe to resume home psych medications. Cardiology evaluated patient and felt that pain was secondary to myocarditis. Ordered cardiac mri with results above. Discontinued propranolol and started on troprol xl and losartan which patient will need to be on for 6 months. HR normal on telemetry. Stable for discharge on current medication regimen with outpatient cardiology follow up. Should avoid strenuous exercise on discharge, but can return to work. Follow up with PCP in 1 week. Follow  "up with cardiology outpatient. Follow up with psych outpatient. Instructed patient to return for any new or worsening symptoms. Can go back to work on 6/29 per cardiology recommendations.     Please see above list of diagnoses and related plan for additional information.     Condition at Discharge: fair    Discharge Day Visit / Exam:   Subjective:  Seen and examined today. States that he is feeling well today. Offers no complaints. No nausea, vomiting, diarrhea, constipation, abdominal pain, CP, SOB, fever, chills.   Vitals: Blood Pressure: 116/58 (06/25/24 0921)  Pulse: 68 (06/25/24 0921)  Temperature: 97.5 °F (36.4 °C) (06/25/24 0636)  Temp Source: Temporal (06/24/24 1104)  Respirations: 16 (06/25/24 0636)  Height: 6' 1\" (185.4 cm) (06/24/24 0718)  Weight - Scale: 120 kg (265 lb) (06/24/24 0718)  SpO2: 99 % (06/25/24 0921)  Exam:   Physical Exam  Vitals reviewed.   Constitutional:       General: He is not in acute distress.     Appearance: He is obese. He is not ill-appearing or toxic-appearing.   HENT:      Head: Normocephalic and atraumatic.      Mouth/Throat:      Mouth: Mucous membranes are moist.   Eyes:      Extraocular Movements: Extraocular movements intact.   Cardiovascular:      Rate and Rhythm: Normal rate and regular rhythm.      Heart sounds: No murmur heard.  Pulmonary:      Effort: No respiratory distress.      Breath sounds: No stridor. No wheezing, rhonchi or rales.   Abdominal:      General: Bowel sounds are normal. There is no distension.      Palpations: Abdomen is soft. There is no mass.      Tenderness: There is no abdominal tenderness.   Musculoskeletal:         General: Normal range of motion.      Right lower leg: No edema.      Left lower leg: No edema.   Skin:     General: Skin is warm and dry.   Neurological:      General: No focal deficit present.      Mental Status: He is alert and oriented to person, place, and time.   Psychiatric:         Mood and Affect: Mood normal.         " Behavior: Behavior normal.       Discussion with Family: Patient declined call to .     Discharge instructions/Information to patient and family:   See after visit summary for information provided to patient and family.      Provisions for Follow-Up Care:  See after visit summary for information related to follow-up care and any pertinent home health orders.      Mobility at time of Discharge:   Basic Mobility Inpatient Raw Score: 24  JH-HLM Goal: 8: Walk 250 feet or more  JH-HLM Achieved: 8: Walk 250 feet ot more  HLM Goal achieved. Continue to encourage appropriate mobility.     Disposition:   Home    Planned Readmission: no     Discharge Statement:  I spent 50 minutes discharging the patient. This time was spent on the day of discharge. I had direct contact with the patient on the day of discharge. Greater than 50% of the total time was spent examining patient, answering all patient questions, arranging and discussing plan of care with patient as well as directly providing post-discharge instructions.  Additional time then spent on discharge activities.    Discharge Medications:  See after visit summary for reconciled discharge medications provided to patient and/or family.      **Please Note: This note may have been constructed using a voice recognition system**

## 2024-06-25 NOTE — ASSESSMENT & PLAN NOTE
"Hx of anxiety disorder with panic attacks ~ once every month , per patient  He used to be on Propranolol in the past which used to help with his HR and Anxiety symptoms , but about 2-3 years ago when he switched provider - as per patient - the med was stopped \"thought to be not helping much with the anxiety\"   Denies excessive coffee intake, \"only 1-2 Brisk iced tea a day\" , denies coffee or energy drinks use, denies drug abuse, non smoker.   TSH wnl   6/23 from ~ 5:30 am - ~ 7:30 am while was at his night shift at [employer] felt palpitation (lasted ~ 2 hours) with mild diaphoresis and transient chest discomfort described as \"somewhat sharp went toward jaw and last ~ 7 minutes then resolved but palpitation remained for two hours\"   In the ED and during admission now NSR 70-80's with BP mildly elevated ~130's low 140's ; Heart sounds normal , no murmur, no JVD, euvolemic on exam , CTAB   Patient is on multiple psych meds which is known to possibly cause arrhythmia and also has hx of panic attacks.   Given the reported tachycardia and panic attacks events will restart propranolol at low dose for now to help with both   continue to monitor on tele and potentially will need extended monitor, consult cardiology, input appreciated   TSH wnl   Echo: Left ventricular cavity size is normal. Wall thickness is normal. The left ventricular ejection fraction is 71%. Systolic function is hyperdynamic. Although no diagnostic regional wall motion abnormality was identified, this possibility cannot be completely excluded on the basis of this study. Diastolic function is normal. Left atrial filling pressure is normal.   Patient on Chloropromazine and lithium which can cause QT prolongation but of note Qtc 6/23 on repeate EKG is WNL   Lithium level <0.10 - patient endorses not taking this as consistently because he has been adjusting his work shifts and also with recently being sick. Will not adjust lithium dosing at this time.   On " effexor 150 mg bid, will continue, monitor HR on telemetry as possible side effect is tachycardia and patient on higher dose.   Discussed with cardiology, can resume Cariprazine  Cardiology: stop propranolol, add toprol xl 25 mg qd, add losartan 25 mg qd, BMP tomorrow, monitor on telemetry, monitor lithium level on losartan. Cardiac MRI tomorrow due to possible concerns of myocarditis.   Continue toprol XL 25 mg qd, continue losartan 25 mg qd. Can go back to work.

## 2024-06-25 NOTE — ASSESSMENT & PLAN NOTE
" Latest Reference Range & Units 06/23/24 07:44 06/23/24 09:13 06/23/24 11:48   Total CK 39 - 308 U/L 210     hs TnI 0hr \"Refer to ACS Flowchart\"- see link ng/L 392 (H)     hs TnI 2hr \"Refer to ACS Flowchart\"- see link ng/L  612 (H)    Delta 2hr hsTnI <20 ng/L  220 (H)    hs TnI 4hr \"Refer to ACS Flowchart\"- see link ng/L   1,661 (H)   Delta 4hr hsTnI <20 ng/L   1,269 (H)   BNP 0 - 100 pg/mL 6       Most likely type II, non ischemic due to the palpitation / tachycardia with HR reportedly > 200 for ~ 2 hours prior to admission from 5:30 AM to about 7:30 AM per patient   Repeate EKG ~ 1 PM , NSR , no ST changes.   Explained while less likely but for completeness and given no hx of bleeding issue, s/p  mg x 1 dose until further Cardiology eval   ESR normal, mildly elevated crp   Will continue to monitor on Tele   Cardiology: suspect acute myocarditis presumed viral from gastroenteritis. Cardiac MRI to be done on 6/25. Stop propranolol, add toprol xl 25 mg qd, add losartan 25 mg qd, bmp tomorrow.   Cardiac MRI normal  Continue toprol xl 25 mg, losartan 25 mg qd, outpatient cardiology follow up. Can continue all psych meds on discharge. Can continue work as normal. No strenuous exercise.   "